# Patient Record
Sex: FEMALE | Race: WHITE | Employment: FULL TIME | URBAN - METROPOLITAN AREA
[De-identification: names, ages, dates, MRNs, and addresses within clinical notes are randomized per-mention and may not be internally consistent; named-entity substitution may affect disease eponyms.]

---

## 2017-06-15 ENCOUNTER — HOSPITAL ENCOUNTER (OUTPATIENT)
Dept: LAB | Age: 52
Discharge: HOME | End: 2017-06-15
Payer: COMMERCIAL

## 2017-06-15 DIAGNOSIS — E55.9 VITAMIN D INSUFFICIENCY: ICD-10-CM

## 2017-06-15 DIAGNOSIS — I10 ESSENTIAL HYPERTENSION: ICD-10-CM

## 2017-06-15 DIAGNOSIS — R73.03 PREDIABETES: ICD-10-CM

## 2017-06-15 LAB
25(OH)D3 SERPL-MCNC: 31.3 NG/ML (ref 30–100)
ANION GAP BLD CALC-SCNC: 6 MMOL/L (ref 3–18)
BUN SERPL-MCNC: 15 MG/DL (ref 7–18)
BUN/CREAT SERPL: 22 (ref 12–20)
CALCIUM SERPL-MCNC: 9.8 MG/DL (ref 8.5–10.1)
CHLORIDE SERPL-SCNC: 105 MMOL/L (ref 100–108)
CO2 SERPL-SCNC: 30 MMOL/L (ref 21–32)
CREAT SERPL-MCNC: 0.67 MG/DL (ref 0.6–1.3)
GLUCOSE SERPL-MCNC: 95 MG/DL (ref 74–99)
HBA1C MFR BLD: 5.6 % (ref 4.2–5.6)
POTASSIUM SERPL-SCNC: 4.5 MMOL/L (ref 3.5–5.5)
SODIUM SERPL-SCNC: 141 MMOL/L (ref 136–145)

## 2017-06-15 PROCEDURE — 82306 VITAMIN D 25 HYDROXY: CPT | Performed by: FAMILY MEDICINE

## 2017-06-15 PROCEDURE — 80048 BASIC METABOLIC PNL TOTAL CA: CPT | Performed by: FAMILY MEDICINE

## 2017-06-15 PROCEDURE — 36415 COLL VENOUS BLD VENIPUNCTURE: CPT | Performed by: FAMILY MEDICINE

## 2017-06-15 PROCEDURE — 83036 HEMOGLOBIN GLYCOSYLATED A1C: CPT | Performed by: FAMILY MEDICINE

## 2017-06-20 ENCOUNTER — OFFICE VISIT (OUTPATIENT)
Dept: FAMILY MEDICINE CLINIC | Age: 52
End: 2017-06-20

## 2017-06-20 VITALS
BODY MASS INDEX: 31.92 KG/M2 | DIASTOLIC BLOOD PRESSURE: 84 MMHG | TEMPERATURE: 98.8 F | WEIGHT: 187 LBS | SYSTOLIC BLOOD PRESSURE: 130 MMHG | HEIGHT: 64 IN | OXYGEN SATURATION: 98 % | HEART RATE: 64 BPM | RESPIRATION RATE: 16 BRPM

## 2017-06-20 DIAGNOSIS — G43.009 MIGRAINE WITHOUT AURA AND WITHOUT STATUS MIGRAINOSUS, NOT INTRACTABLE: ICD-10-CM

## 2017-06-20 DIAGNOSIS — I10 ESSENTIAL HYPERTENSION: Primary | ICD-10-CM

## 2017-06-20 DIAGNOSIS — E55.9 VITAMIN D DEFICIENCY: ICD-10-CM

## 2017-06-20 DIAGNOSIS — Z12.11 COLON CANCER SCREENING: ICD-10-CM

## 2017-06-20 DIAGNOSIS — R73.03 PREDIABETES: ICD-10-CM

## 2017-06-20 NOTE — PROGRESS NOTES
Chief Complaint   Patient presents with    Hypertension    Vitamin D Deficiency    Other     Pre-DM    Results     1. Have you been to the ER, urgent care clinic since your last visit? Hospitalized since your last visit? No    2. Have you seen or consulted any other health care providers outside of the [address] [place] [address] since your last visit? Include any pap smears or colon screening.  No

## 2017-06-20 NOTE — PROGRESS NOTES
Jeremías Hoang, 45 y.o.,  female    SUBJECTIVE  Routine ff-up    HTN- diet controlled for some time now.  says BP wnl. Continues to avoid sodas. Walking 4.5 miles/day. Reviewed labs    Prediabetes-reviewed labs, mildly deficient vit d, has not been taking supplements. Migraine headaches-says doing well, seldom episodes and imitrex effective. None past year. Says submit fit test for colon ca screening last week    ROS:  See HPI, all others negative        Patient Active Problem List   Diagnosis Code    Hypertension I10    Migraine G43.909    Vitamin D deficiency E55.9    Prediabetes R73.03    Colon cancer screening Z12.11       Current Outpatient Prescriptions   Medication Sig Dispense Refill    SUMAtriptan (IMITREX) 100 mg tablet Take 1 Tab by mouth once as needed for Migraine for up to 1 dose. Can repeat in 2 hours if persists. No more than 2 tabs in 24 hours. 9 Tab 0       No Known Allergies    Past Medical History:   Diagnosis Date    Anxiety     Depression     medication briefly a few years ago    Hypertension     previously on medication, stopped it on own about a year ago    Migraine     prn excedrine migraine    Pap smear about 10 years ago    no abnormals    Prediabetes 5/2015    Screening mammogram last about 2007    Vitamin D deficiency        Social History     Social History    Marital status:      Spouse name: N/A    Number of children: N/A    Years of education: N/A     Occupational History    Not on file.      Social History Main Topics    Smoking status: Never Smoker    Smokeless tobacco: Never Used    Alcohol use No    Drug use: No    Sexual activity: Not Currently     Partners: Male     Birth control/ protection: None      Comment: not for past 2 years     Other Topics Concern    Not on file     Social History Narrative       Family History   Problem Relation Age of Onset    Cancer Mother      non-hodgkins     Cancer Sister 28     breast    Breast Cancer Sister 39    Breast Cancer Maternal Grandmother          OBJECTIVE    Physical Exam:     Visit Vitals    /84 (BP 1 Location: Left arm, BP Patient Position: Sitting)    Pulse 64    Temp 98.8 °F (37.1 °C) (Oral)    Resp 16    Ht 5' 4\" (1.626 m)    Wt 187 lb (84.8 kg)    SpO2 98%    BMI 32.1 kg/m2       General: alert, well-appearing, in no apparent distress or pain  CVS: normal rate, regular rhythm, distinct S1 and S2  Lungs:clear to ausculation bilaterally, no crackles, wheezing or rhonchi noted  Extremities: no edema, no cyanosis,  Skin: warm, no lesions, rashes noted  Psych:  mood and affect normal    Results for orders placed or performed during the hospital encounter of 06/15/17   HEMOGLOBIN A1C W/O EAG   Result Value Ref Range    Hemoglobin A1c 5.6 4.2 - 5.6 %   METABOLIC PANEL, BASIC   Result Value Ref Range    Sodium 141 136 - 145 mmol/L    Potassium 4.5 3.5 - 5.5 mmol/L    Chloride 105 100 - 108 mmol/L    CO2 30 21 - 32 mmol/L    Anion gap 6 3.0 - 18 mmol/L    Glucose 95 74 - 99 mg/dL    BUN 15 7.0 - 18 MG/DL    Creatinine 0.67 0.6 - 1.3 MG/DL    BUN/Creatinine ratio 22 (H) 12 - 20      GFR est AA >60 >60 ml/min/1.73m2    GFR est non-AA >60 >60 ml/min/1.73m2    Calcium 9.8 8.5 - 10.1 MG/DL   VITAMIN D, 25 HYDROXY   Result Value Ref Range    Vitamin D 25-Hydroxy 31.3 30 - 100 ng/mL           ASSESSMENT/PLAN  Lakesha Petit was seen today for hypertension, migraine, vitamin d deficiency and leg pain. Diagnoses and all orders for this visit:    Migraine without aura and without status migrainosus, not intractable-  Improved, cont prn imitrex  Avoid ha triggers    Essential hypertension-  H/o, now Diet controlled, monitoring    Colon cancer screening-  Pt has submitted FIT test, await results    Prediabetes  Improved, commended on lifestyle changes  Monitoring      Follow-up Disposition:  Return in about 5 months (around 11/20/2017), or plan for CPE then. .     Patient understands plan of care.  Patient has provided input and agrees with goals.

## 2017-06-20 NOTE — MR AVS SNAPSHOT
Visit Information Date & Time Provider Department Dept. Phone Encounter #  
 6/20/2017  9:15 AM Rohith Grimes, [address] [id number] Follow-up Instructions Return in about 5 months (around 11/20/2017), or plan for CPE then. James Lindsey Upcoming Health Maintenance Date Due FOBT Q 1 YEAR AGE 50-75 11/14/2014 INFLUENZA AGE 9 TO ADULT 8/1/2017 BREAST CANCER SCRN MAMMOGRAM 9/13/2018 PAP AKA CERVICAL CYTOLOGY 9/7/2019 DTaP/Tdap/Td series (2 - Td) 2/1/2023 Allergies as of 6/20/2017  Review Complete On: 6/20/2017 By: Yamilet Leslie LPN No Known Allergies Current Immunizations  Reviewed on 2/1/2013 Name Date Tdap 2/1/2013 Not reviewed this visit You Were Diagnosed With   
  
 Codes Comments Essential hypertension    -  Primary ICD-10-CM: I10 
ICD-9-CM: 401.9 Prediabetes     ICD-10-CM: R73.03 
ICD-9-CM: 790.29 Vitamin D deficiency     ICD-10-CM: E55.9 ICD-9-CM: 268.9 Migraine without aura and without status migrainosus, not intractable     ICD-10-CM: M87.833 ICD-9-CM: 346.10 Colon cancer screening     ICD-10-CM: Z12.11 ICD-9-CM: V76.51 Vitals BP Pulse Temp Resp Height(growth percentile) Weight(growth percentile) 130/84 (BP 1 Location: Left arm, BP Patient Position: Sitting) 64 98.8 °F (37.1 °C) (Oral) 16 5' 4\" (1.626 m) 187 lb (84.8 kg) SpO2 BMI OB Status Smoking Status 98% 32.1 kg/m2 Perimenopausal Never Smoker Vitals History BMI and BSA Data Body Mass Index Body Surface Area  
 32.1 kg/m 2 1.96 m 2 Preferred Pharmacy Pharmacy Name Phone [id number]  [facility], [address] [place] [address] Ave [phone] Your Updated Medication List  
  
   
This list is accurate as of: 6/20/17  9:42 AM.  Always use your most recent med list.  
  
  
  
  
 SUMAtriptan 100 mg tablet Commonly known as:  IMITREX Take 1 Tab by mouth once as needed for Migraine for up to 1 dose. Can repeat in 2 hours if persists. No more than 2 tabs in 24 hours. Follow-up Instructions Return in about 5 months (around 11/20/2017), or plan for CPE then. Glenis Gee Patient Instructions Caltrate 1200 mg of calcium and 800 international units of Vitamin D. Introducing hospitals & HEALTH SERVICES! Shikhalogan Bradley introduces [email or web address] patient portal. Now you can access parts of your medical record, email your doctor's office, and request medication refills online. 1. In your internet browser, go to [email or web address] 2. Click on the First Time User? Click Here link in the Sign In box. You will see the New Member Sign Up page. 3. Enter your [email or web address] Access Code exactly as it appears below. You will not need to use this code after youve completed the sign-up process. If you do not sign up before the expiration date, you must request a new code. · [email or web address] Access Code: [email or web address] Expires: 9/18/2017  9:42 AM 
 
4. Enter the last four digits of your Social Security Number (xxxx) and Date of Birth (mm/dd/yyyy) as indicated and click Submit. You will be taken to the next sign-up page. 5. Create a [email or web address] ID. This will be your [email or web address] login ID and cannot be changed, so think of one that is secure and easy to remember. 6. Create a [email or web address] password. You can change your password at any time. 7. Enter your Password Reset Question and Answer. This can be used at a later time if you forget your password. 8. Enter your e-mail address. You will receive e-mail notification when new information is available in [address] 9. Click Sign Up. You can now view and download portions of your medical record. 10. Click the Download Summary menu link to download a portable copy of your medical information.  
 
If you have questions, please visit the Frequently Asked Questions section of the "[employer] Remember, [employer] is NOT to be used for urgent needs. For medical emergencies, dial 911. Now available from your iPhone and Android! Please provide this summary of care documentation to your next provider. Your primary care clinician is listed as Sri Palma. If you have any questions after today's visit, please call [phone].

## 2018-01-16 ENCOUNTER — OFFICE VISIT (OUTPATIENT)
Dept: FAMILY MEDICINE CLINIC | Age: 53
End: 2018-01-16

## 2018-01-16 ENCOUNTER — HOSPITAL ENCOUNTER (OUTPATIENT)
Dept: LAB | Age: 53
Discharge: HOME | End: 2018-01-16
Payer: COMMERCIAL

## 2018-01-16 VITALS
DIASTOLIC BLOOD PRESSURE: 82 MMHG | HEART RATE: 67 BPM | BODY MASS INDEX: 32.44 KG/M2 | OXYGEN SATURATION: 98 % | WEIGHT: 190 LBS | SYSTOLIC BLOOD PRESSURE: 126 MMHG | TEMPERATURE: 98.6 F | RESPIRATION RATE: 16 BRPM | HEIGHT: 64 IN

## 2018-01-16 DIAGNOSIS — E55.9 VITAMIN D DEFICIENCY: ICD-10-CM

## 2018-01-16 DIAGNOSIS — I10 ESSENTIAL HYPERTENSION: ICD-10-CM

## 2018-01-16 DIAGNOSIS — Z01.419 WELL WOMAN EXAM WITH ROUTINE GYNECOLOGICAL EXAM: Primary | ICD-10-CM

## 2018-01-16 DIAGNOSIS — M25.561 ACUTE PAIN OF RIGHT KNEE: ICD-10-CM

## 2018-01-16 DIAGNOSIS — G43.009 MIGRAINE WITHOUT AURA AND WITHOUT STATUS MIGRAINOSUS, NOT INTRACTABLE: ICD-10-CM

## 2018-01-16 DIAGNOSIS — R73.03 PREDIABETES: ICD-10-CM

## 2018-01-16 LAB
ALBUMIN SERPL-MCNC: 4.3 G/DL (ref 3.4–5)
ALBUMIN/GLOB SERPL: 1.2 {RATIO} (ref 0.8–1.7)
ALP SERPL-CCNC: 80 U/L (ref 45–117)
ALT SERPL-CCNC: 28 U/L (ref 13–56)
ANION GAP SERPL CALC-SCNC: 5 MMOL/L (ref 3–18)
AST SERPL-CCNC: 17 U/L (ref 15–37)
BILIRUB SERPL-MCNC: 1.1 MG/DL (ref 0.2–1)
BUN SERPL-MCNC: 18 MG/DL (ref 7–18)
BUN/CREAT SERPL: 28 (ref 12–20)
CALCIUM SERPL-MCNC: 9.7 MG/DL (ref 8.5–10.1)
CHLORIDE SERPL-SCNC: 104 MMOL/L (ref 100–108)
CHOLEST SERPL-MCNC: 211 MG/DL
CO2 SERPL-SCNC: 32 MMOL/L (ref 21–32)
CREAT SERPL-MCNC: 0.65 MG/DL (ref 0.6–1.3)
GLOBULIN SER CALC-MCNC: 3.5 G/DL (ref 2–4)
GLUCOSE SERPL-MCNC: 83 MG/DL (ref 74–99)
HDLC SERPL-MCNC: 59 MG/DL (ref 40–60)
HDLC SERPL: 3.6 {RATIO} (ref 0–5)
LDLC SERPL CALC-MCNC: 132.2 MG/DL (ref 0–100)
LIPID PROFILE,FLP: ABNORMAL
POTASSIUM SERPL-SCNC: 4.6 MMOL/L (ref 3.5–5.5)
PROT SERPL-MCNC: 7.8 G/DL (ref 6.4–8.2)
SODIUM SERPL-SCNC: 141 MMOL/L (ref 136–145)
TRIGL SERPL-MCNC: 99 MG/DL (ref ?–150)
VLDLC SERPL CALC-MCNC: 19.8 MG/DL

## 2018-01-16 PROCEDURE — 80053 COMPREHEN METABOLIC PANEL: CPT | Performed by: FAMILY MEDICINE

## 2018-01-16 PROCEDURE — 80061 LIPID PANEL: CPT | Performed by: FAMILY MEDICINE

## 2018-01-16 RX ORDER — SUMATRIPTAN 100 MG/1
100 TABLET, FILM COATED ORAL
Qty: 9 TAB | Refills: 0 | Status: SHIPPED | OUTPATIENT
Start: 2018-01-16 | End: 2020-03-19

## 2018-01-16 RX ORDER — METHYLPREDNISOLONE 4 MG/1
TABLET ORAL
Qty: 1 DOSE PACK | Refills: 0 | Status: SHIPPED | OUTPATIENT
Start: 2018-01-16 | End: 2020-03-19

## 2018-01-16 NOTE — PROGRESS NOTES
Chief Complaint   Patient presents with    Well Woman     last pap 9/7/16-WNL    Hypertension    Other     Pre-DM    Vitamin D Deficiency    Knee Pain     Since early Dec- seen at [address] [place] [address] and was told arthritis- no improvement and feels worse soomedays     Patient presents for annual pap smear. Last pap 9/7/16. Abnormal Pap smears -No.  Procedures if indicated No .Form of contraception -No. Mammogram (if indicated) 9/13/16. Family history of breast CA -MGM 60's-D, Sister 34-D, colon CA -No, cervical CA -No.Tetanus 2/1/2013.  States mom passed from Non Hodgkin's Lymphoma

## 2018-01-16 NOTE — PATIENT INSTRUCTIONS
Knee (Pes Anserine) Bursitis: Exercises  Your Care Instructions  Here are some examples of typical rehabilitation exercises for your condition. Start each exercise slowly. Ease off the exercise if you start to have pain. Your doctor or physical therapist will tell you when you can start these exercises and which ones will work best for you. How to do the exercises  Heel slide    1. Lie on your back with your affected knee straight. Your good knee should be bent. 2. Bend your affected knee by sliding your heel across the floor and toward your buttock until you feel a gentle stretch in your knee. 3. Hold for about 6 seconds, and then slowly straighten your knee. 4. Repeat 8 to 12 times. Quad sets    1. Sit with your affected leg straight and supported on the floor or a firm bed. Place a small, rolled-up towel under your affected knee. Your other leg should be bent, with that foot flat on the floor. 2. Tighten the thigh muscles of your affected leg by pressing the back of your knee down into the towel. 3. Hold for about 6 seconds, then rest for up to 10 seconds. 4. Repeat 8 to 12 times. Straight-leg raises to the front    1. Lie on your back with your good knee bent so that your foot rests flat on the floor. Your affected leg should be straight. Make sure that your low back has a normal curve. You should be able to slip your hand in between the floor and the small of your back, with your palm touching the floor and your back touching the back of your hand. 2. Tighten the thigh muscles in your affected leg by pressing the back of your knee flat down to the floor. Hold your knee straight. 3. Keeping the thigh muscles tight and your leg straight, lift your affected leg up so that your heel is about 12 inches off the floor. 4. Hold for about 6 seconds, then lower slowly. Rest for up to 10 seconds between repetitions. 5. Repeat 8 to 12 times. Follow-up care is a key part of your treatment and safety.  Be sure to make and go to all appointments, and call your doctor if you are having problems. It's also a good idea to know your test results and keep a list of the medicines you take. Where can you learn more? Go to http://beata-ryan.info/. Enter K371 in the search box to learn more about \"Knee (Pes Anserine) Bursitis: Exercises. \"  Current as of: March 21, 2017  Content Version: 11.4  © 0952-3628 Healthwise, Incorporated. Care instructions adapted under license by [employer] (which disclaims liability or warranty for this information). If you have questions about a medical condition or this instruction, always ask your healthcare professional. [address] any warranty or liability for your use of this information.

## 2018-01-16 NOTE — MR AVS SNAPSHOT
[address] 
[phone] Patient: Joana Miranda MRN: [id number] :1965 Visit Information Date & Time Provider Department Dept. Phone Encounter #  
 2018  638 [facility], [address] [place] [address] [id number] Follow-up Instructions Return in about 3 weeks (around 2018), or if symptoms worsen or fail to improve. Upcoming Health Maintenance Date Due FOBT Q 1 YEAR AGE 50-75 6/15/2018 BREAST CANCER SCRN MAMMOGRAM 2018 PAP AKA CERVICAL CYTOLOGY 2019 DTaP/Tdap/Td series (2 - Td) 2023 Allergies as of 2018  Review Complete On: 2018 By: Antoni Andrade MD  
 No Known Allergies Current Immunizations  Reviewed on 2013 Name Date Tdap 2013 Not reviewed this visit You Were Diagnosed With   
  
 Codes Comments Well woman exam with routine gynecological exam    -  Primary ICD-10-CM: G26.614 ICD-9-CM: V72.31 Prediabetes     ICD-10-CM: R73.03 
ICD-9-CM: 790.29 Vitamin D deficiency     ICD-10-CM: E55.9 ICD-9-CM: 268.9 Migraine without aura and without status migrainosus, not intractable     ICD-10-CM: X29.740 ICD-9-CM: 346.10 Essential hypertension     ICD-10-CM: I10 
ICD-9-CM: 401.9 Acute pain of right knee     ICD-10-CM: M25.561 ICD-9-CM: 719.46 Vitals BP Pulse Temp Resp Height(growth percentile) Weight(growth percentile) 126/82 (BP 1 Location: Left arm, BP Patient Position: Sitting) 67 98.6 °F (37 °C) (Oral) 16 5' 4\" (1.626 m) 190 lb (86.2 kg) SpO2 BMI OB Status Smoking Status 98% 32.61 kg/m2 Perimenopausal Never Smoker BMI and BSA Data Body Mass Index Body Surface Area  
 32.61 kg/m 2 1.97 m 2 Preferred Pharmacy Pharmacy Name Phone [id number]  [facility], [address] [facility] [phone] Your Updated Medication List  
  
   
This list is accurate as of: 1/16/18  9:38 AM.  Always use your most recent med list.  
  
  
  
  
 SUMAtriptan 100 mg tablet Commonly known as:  IMITREX Take 1 Tab by mouth once as needed for Migraine for up to 1 dose. Can repeat in 2 hours if persists. No more than 2 tabs in 24 hours. Prescriptions Sent to Pharmacy Refills SUMAtriptan (IMITREX) 100 mg tablet 0 Sig: Take 1 Tab by mouth once as needed for Migraine for up to 1 dose. Can repeat in 2 hours if persists. No more than 2 tabs in 24 hours. Class: Normal  
 [facility]: [facility] #: [phone] Route: Oral  
  
Follow-up Instructions Return in about 3 weeks (around 2/6/2018), or if symptoms worsen or fail to improve. To-Do List   
 01/16/2018 Lab:  LIPID PANEL   
  
 01/16/2018 Imaging:  PELON MAMMO BI SCREENING INCL CAD   
  
 01/16/2018 Lab:  METABOLIC PANEL, COMPREHENSIVE   
  
 01/16/2018 Imaging:  XR KNEE RT MIN 4 V Patient Instructions Knee (Pes Anserine) Bursitis: Exercises Your Care Instructions Here are some examples of typical rehabilitation exercises for your condition. Start each exercise slowly. Ease off the exercise if you start to have pain. Your doctor or physical therapist will tell you when you can start these exercises and which ones will work best for you. How to do the exercises Heel slide 1. Lie on your back with your affected knee straight. Your good knee should be bent. 2. Bend your affected knee by sliding your heel across the floor and toward your buttock until you feel a gentle stretch in your knee. 3. Hold for about 6 seconds, and then slowly straighten your knee. 4. Repeat 8 to 12 times. [place] 1. Sit with your affected leg straight and supported on the floor or a firm bed. Place a small, rolled-up towel under your affected knee.  Your other leg should be bent, with that foot flat on the floor. 2. Tighten the thigh muscles of your affected leg by pressing the back of your knee down into the towel. 3. Hold for about 6 seconds, then rest for up to 10 seconds. 4. Repeat 8 to 12 times. Straight-leg raises to the front 1. Lie on your back with your good knee bent so that your foot rests flat on the floor. Your affected leg should be straight. Make sure that your low back has a normal curve. You should be able to slip your hand in between the floor and the small of your back, with your palm touching the floor and your back touching the back of your hand. 2. Tighten the thigh muscles in your affected leg by pressing the back of your knee flat down to the floor. Hold your knee straight. 3. Keeping the thigh muscles tight and your leg straight, lift your affected leg up so that your heel is about 12 inches off the floor. 4. Hold for about 6 seconds, then lower slowly. Rest for up to 10 seconds between repetitions. 5. Repeat 8 to 12 times. Follow-up care is a key part of your treatment and safety. Be sure to make and go to all appointments, and call your doctor if you are having problems. It's also a good idea to know your test results and keep a list of the medicines you take. Where can you learn more? Go to http://beata-ryan.info/. Enter W917 in the search box to learn more about \"Knee (Pes Anserine) Bursitis: Exercises. \" Current as of: March 21, 2017 Content Version: 11.4 © 8261-8223 Healthwise, Incorporated. Care instructions adapted under license by [employer] (which disclaims liability or warranty for this information). If you have questions about a medical condition or this instruction, always ask your healthcare professional. [address] any warranty or liability for your use of this information. Introducing \[facility]\"" & HEALTH SERVICES! Michele David introduces [employer] patient portal. Now you can access parts of your medical record, email your doctor's office, and request medication refills online. 1. In your internet browser, go to [email or web address] 2. Click on the First Time User? Click Here link in the Sign In box. You will see the New Member Sign Up page. 3. Enter your [employer] Access Code exactly as it appears below. You will not need to use this code after youve completed the sign-up process. If you do not sign up before the expiration date, you must request a new code. · [employer] Access Code: [email or web address] Expires: 4/16/2018  9:38 AM 
 
4. Enter the last four digits of your Social Security Number (xxxx) and Date of Birth (mm/dd/yyyy) as indicated and click Submit. You will be taken to the next sign-up page. 5. Create a [employer] ID. This will be your [employer] login ID and cannot be changed, so think of one that is secure and easy to remember. 6. Create a [employer] password. You can change your password at any time. 7. Enter your Password Reset Question and Answer. This can be used at a later time if you forget your password. 8. Enter your e-mail address. You will receive e-mail notification when new information is available in [address] 9. Click Sign Up. You can now view and download portions of your medical record. 10. Click the Download Summary menu link to download a portable copy of your medical information. If you have questions, please visit the Frequently Asked Questions section of the [employer] website. Remember, [employer] is NOT to be used for urgent needs. For medical emergencies, dial 911. Now available from your iPhone and Android! Please provide this summary of care documentation to your next provider. Your primary care clinician is listed as Joselin Jean-Baptiste. If you have any questions after today's visit, please call [phone].

## 2018-01-16 NOTE — PROGRESS NOTES
Subjective:   47 y.o. female for Well Woman Check. No LMP recorded. Patient is not currently having periods (Reason: Perimenopausal). H/o HTN- that is not diet controlled. Off medications for few years now  Prediabetes-reports some dietary inconsistencies  Migraine headaches- says rare episodes, requesting refill on imitrex  R knee pain for 1 month now, constant worse with plantar flexion when driving. No trauma, swelling. Has tried ibuprofen without much relief. Says seen in patient first xray showing mild arthritis. No significant improvement    Past Medical History:   Diagnosis Date    Anxiety     Depression     medication briefly a few years ago    Hypertension     previously on medication, stopped it on own about a year ago    Migraine     prn excedrine migraine    Pap smear about 10 years ago    no abnormals    Prediabetes 5/2015    Screening mammogram last about 2007    Vitamin D deficiency      History reviewed. No pertinent surgical history. Family History   Problem Relation Age of Onset    Cancer Mother      non-hodgkins     Cancer Sister 28     breast    Breast Cancer Sister 39    Breast Cancer Maternal Grandmother      Social History   Substance Use Topics    Smoking status: Never Smoker    Smokeless tobacco: Never Used    Alcohol use No        ROS:  Feeling well. No dyspnea or chest pain on exertion. No abdominal pain, change in bowel habits, black or bloody stools. No urinary tract symptoms. GYN ROS: no breast pain or new or enlarging lumps on self exam. No neurological complaints. Objective:     Visit Vitals    /82 (BP 1 Location: Left arm, BP Patient Position: Sitting)    Pulse 67    Temp 98.6 °F (37 °C) (Oral)    Resp 16    Ht 5' 4\" (1.626 m)    Wt 190 lb (86.2 kg)    SpO2 98%    BMI 32.61 kg/m2     The patient appears well, alert, oriented x 3, in no distress. ENT normal.  Neck supple. No adenopathy or thyromegaly. JIAN.  Lungs are clear, good air entry, no wheezes, rhonchi or rales. S1 and S2 normal, no murmurs, regular rate and rhythm. Abdomen soft without tenderness, guarding, mass or organomegaly. Extremities show no edema, normal peripheral pulses. R knee +MTTP on R anteromedial area. Neurological is normal, no focal findings. BREAST EXAM: breasts appear normal, no suspicious masses, no skin or nipple changes or axillary nodes    PELVIC EXAM: examination not indicated    Assessment/Plan:   Diagnoses and all orders for this visit:    1. Well woman exam with routine gynecological exam  -     [facility] MAMMO BI SCREENING INCL CAD; Future  well woman  Mammogram-due soon, order placed  pap smear- update 2019  return annually or prn  reviewed diet, exercise and weight control. FIT test update 6/18  tdap UTD  Declines flu vaccine    2. Prediabetes  Tlcs, monitoring  -     METABOLIC PANEL, COMPREHENSIVE; Future    3. Vitamin D deficiency  Cont daily supplement  Recheck 6.18    4. Migraine without aura and without status migrainosus, not intractable  Stable, cont prn imitrex    5. Essential hypertension  Diet controlled, off medication for few years now  monitoring  -     METABOLIC PANEL, COMPREHENSIVE; Future  -     LIPID PANEL; Future    6. Acute pain of right knee  Pes anserine bursitis HEP provided  Medrol dose pack  Close ff-up  Per pt xray from patient first shows mild OA, request records    Other orders  -     SUMAtriptan (IMITREX) 100 mg tablet; Take 1 Tab by mouth once as needed for Migraine for up to 1 dose. Can repeat in 2 hours if persists. No more than 2 tabs in 24 hours. -     methylPREDNISolone (MEDROL, LAMAR,) 4 mg tablet; Per dose pack instructions    Ff-up in 3 weeks or sooner prn    Patient/guardian understands plan of care. Patient has provided input and agrees with goals. Future labs to be discussed on next visit.

## 2018-01-22 ENCOUNTER — OFFICE VISIT (OUTPATIENT)
Dept: FAMILY MEDICINE CLINIC | Age: 53
End: 2018-01-22

## 2018-01-22 VITALS
TEMPERATURE: 97.9 F | DIASTOLIC BLOOD PRESSURE: 84 MMHG | RESPIRATION RATE: 16 BRPM | HEART RATE: 73 BPM | WEIGHT: 190 LBS | OXYGEN SATURATION: 98 % | HEIGHT: 64 IN | BODY MASS INDEX: 32.44 KG/M2 | SYSTOLIC BLOOD PRESSURE: 128 MMHG

## 2018-01-22 DIAGNOSIS — Z86.79 HISTORY OF HYPERTENSION: ICD-10-CM

## 2018-01-22 DIAGNOSIS — E55.9 VITAMIN D DEFICIENCY: ICD-10-CM

## 2018-01-22 DIAGNOSIS — G43.009 MIGRAINE WITHOUT AURA AND WITHOUT STATUS MIGRAINOSUS, NOT INTRACTABLE: ICD-10-CM

## 2018-01-22 DIAGNOSIS — R73.03 PREDIABETES: ICD-10-CM

## 2018-01-22 DIAGNOSIS — M79.651 PAIN OF RIGHT THIGH: Primary | ICD-10-CM

## 2018-01-22 DIAGNOSIS — M25.561 ACUTE PAIN OF RIGHT KNEE: ICD-10-CM

## 2018-01-22 RX ORDER — NAPROXEN 500 MG/1
500 TABLET ORAL 2 TIMES DAILY WITH MEALS
Qty: 30 TAB | Refills: 0 | Status: SHIPPED | OUTPATIENT
Start: 2018-01-22 | End: 2020-03-19

## 2018-01-22 RX ORDER — CYCLOBENZAPRINE HCL 10 MG
10 TABLET ORAL
Qty: 30 TAB | Refills: 0 | Status: SHIPPED | OUTPATIENT
Start: 2018-01-22 | End: 2020-03-19

## 2018-01-22 RX ORDER — KETOROLAC TROMETHAMINE 30 MG/ML
60 INJECTION, SOLUTION INTRAMUSCULAR; INTRAVENOUS ONCE
Qty: 1 VIAL | Refills: 0
Start: 2018-01-22 | End: 2018-01-22

## 2018-01-22 NOTE — LETTER
NOTIFICATION RETURN TO WORK / SCHOOL 
 
1/22/2018 3:27 PM 
 
Ms. Iqra Lay [address] [place] [address] [phone] [id number] To Whom It May Concern: 
 
Iqra Lay is currently under the care of [facility]. Please excuse from work 1/22/2018-1/24/2018 If there are questions or concerns please have the patient contact our office.  
 
 
 
Sincerely, 
 
 
Ludy Chávez MD

## 2018-01-22 NOTE — PROGRESS NOTES
1. Have you been to the ER, urgent care clinic since your last visit? Hospitalized since your last visit? No    2. Have you seen or consulted any other health care providers outside of the [address] [place] [address] since your last visit? Include any pap smears or colon screening.  No

## 2018-01-22 NOTE — PROGRESS NOTES
Ff-up    H/o HTN- that is not diet controlled. Off medications for few years now    Prediabetes-reports some dietary inconsistencies, reviewed labs improved    Migraine headaches- says rare episodes, requesting refill on imitrex    R knee pain for 1 month now, constant worse with plantar flexion when driving. No trauma, swelling. Has tried ibuprofen without much relief. Says seen in patient first xray showing mild arthritis. No significant improvement- given po steroids and HEP for pes anserine bursitis with some improvement  This morning after few steps going downstairs she had intense pain on posterior leg and thigh, constant. She says can hardly stand up. No trauma or swelling. Past Medical History:   Diagnosis Date    Anxiety     Depression     medication briefly a few years ago    Hypertension     previously on medication, stopped it on own about a year ago    Migraine     prn excedrine migraine    Pap smear about 10 years ago    no abnormals    Prediabetes 5/2015    Screening mammogram last about 2007    Vitamin D deficiency      History reviewed. No pertinent surgical history. Family History   Problem Relation Age of Onset    Cancer Mother      non-hodgkins     Cancer Sister 28     breast    Breast Cancer Sister 39    Breast Cancer Maternal Grandmother      Social History   Substance Use Topics    Smoking status: Never Smoker    Smokeless tobacco: Never Used    Alcohol use No        ROS:  Feeling well. No dyspnea or chest pain on exertion. No abdominal pain, change in bowel habits, black or bloody stools. No urinary tract symptoms. GYN ROS: no breast pain or new or enlarging lumps on self exam. No neurological complaints.     Objective:     Visit Vitals    /84 (BP 1 Location: Left arm, BP Patient Position: Sitting)    Pulse 73    Temp 97.9 °F (36.6 °C) (Oral)    Resp 16    Ht 5' 4\" (1.626 m)    Wt 190 lb (86.2 kg)    SpO2 98%    BMI 32.61 kg/m2     The patient appears well, on wheelchair, appears to be in pain. ENT normal.  Neck supple. No adenopathy or thyromegaly. JIAN. Lungs are clear, good air entry, no wheezes, rhonchi or rales. S1 and S2 normal, no murmurs, regular rate and rhythm. Abdomen soft without tenderness, guarding, mass or organomegaly. Extremities show no edema, normal peripheral pulses. R knee  FROM +MTTP on R hamstring area. Neurological is normal, no focal findings. Results for orders placed or performed during the hospital encounter of 01/16/18   LIPID PANEL   Result Value Ref Range    LIPID PROFILE          Cholesterol, total 211 (H) <200 MG/DL    Triglyceride 99 <150 MG/DL    HDL Cholesterol 59 40 - 60 MG/DL    LDL, calculated 132.2 (H) 0 - 100 MG/DL    VLDL, calculated 19.8 MG/DL    CHOL/HDL Ratio 3.6 0 - 5.0     METABOLIC PANEL, COMPREHENSIVE   Result Value Ref Range    Sodium 141 136 - 145 mmol/L    Potassium 4.6 3.5 - 5.5 mmol/L    Chloride 104 100 - 108 mmol/L    CO2 32 21 - 32 mmol/L    Anion gap 5 3.0 - 18 mmol/L    Glucose 83 74 - 99 mg/dL    BUN 18 7.0 - 18 MG/DL    Creatinine 0.65 0.6 - 1.3 MG/DL    BUN/Creatinine ratio 28 (H) 12 - 20      GFR est AA >60 >60 ml/min/1.73m2    GFR est non-AA >60 >60 ml/min/1.73m2    Calcium 9.7 8.5 - 10.1 MG/DL    Bilirubin, total 1.1 (H) 0.2 - 1.0 MG/DL    ALT (SGPT) 28 13 - 56 U/L    AST (SGOT) 17 15 - 37 U/L    Alk.  phosphatase 80 45 - 117 U/L    Protein, total 7.8 6.4 - 8.2 g/dL    Albumin 4.3 3.4 - 5.0 g/dL    Globulin 3.5 2.0 - 4.0 g/dL    A-G Ratio 1.2 0.8 - 1.7         Assessment/Plan:   Diagnoses and all orders for this visit:     Acute pain of right knee  Pes anserine bursitis HEP provided  Some improvement after Medrol dose pack  Per pt xray from patient first shows mild OA   referral to ortho for persistent pain    Acute posterior thigh pain  HEP provided for hamstring strain  toradol IM given today  Flexeril/naprosyn  Close ff-up     Prediabetes  Tlcs, monitoring    Vitamin D deficiency  Cont daily supplement  Recheck 6.18     Migraine without aura and without status migrainosus, not intractable  Stable, cont prn imitrex    History of hypertension    Ff-up in 4  Days or sooner prn    Patient/guardian understands plan of care. Patient has provided input and agrees with goals. Future labs to be discussed on next visit.

## 2018-01-22 NOTE — MR AVS SNAPSHOT
[address] [facility] 250 200 [facility] 
[phone] Patient: David Carson MRN: [id number] :1965 Visit Information Date & Time Provider Department Dept. Phone Encounter #  
 2018  2:45 PM Maru Santillan, [address] [place] [address] [id number] Follow-up Instructions Return in about 4 days (around 2018). Your Appointments 2018  8:15 AM  
FOLLOW UP EXAM with Maru Santillan MD  
 [facility] ([facility]) Appt Note: routine f/u 3wks [address] [facility] [address] 200 [facility] Se  
[address] [facility] 200 [facility] [address] Upcoming Health Maintenance Date Due FOBT Q 1 YEAR AGE 50-75 6/15/2018 BREAST CANCER SCRN MAMMOGRAM 2018 PAP AKA CERVICAL CYTOLOGY 2019 DTaP/Tdap/Td series (2 - Td) 2023 Allergies as of 2018  Review Complete On: 2018 By: Princess Coleman LPN No Known Allergies Current Immunizations  Reviewed on 2013 Name Date Tdap 2013 Not reviewed this visit You Were Diagnosed With   
  
 Codes Comments Pain of right thigh    -  Primary ICD-10-CM: M86.029 ICD-9-CM: 729.5 Prediabetes     ICD-10-CM: R73.03 
ICD-9-CM: 790.29 Vitamin D deficiency     ICD-10-CM: E55.9 ICD-9-CM: 268.9 Migraine without aura and without status migrainosus, not intractable     ICD-10-CM: Y08.285 ICD-9-CM: 346.10 History of hypertension     ICD-10-CM: Z86.79 
ICD-9-CM: V12.59 Acute pain of right knee     ICD-10-CM: M25.561 ICD-9-CM: 719.46 Vitals BP Pulse Temp Resp Height(growth percentile) Weight(growth percentile) 128/84 (BP 1 Location: Left arm, BP Patient Position: Sitting) 73 97.9 °F (36.6 °C) (Oral) 16 5' 4\" (1.626 m) 190 lb (86.2 kg) SpO2 BMI OB Status Smoking Status 98% 32.61 kg/m2 Perimenopausal Never Smoker Vitals History BMI and BSA Data Body Mass Index Body Surface Area  
 32.61 kg/m 2 1.97 m 2 Preferred Pharmacy Pharmacy Name Phone [id number]  [address] [facility], [address] [place] [address] [phone] Your Updated Medication List  
  
   
This list is accurate as of: 1/22/18  3:26 PM.  Always use your most recent med list.  
  
  
  
  
 cyclobenzaprine 10 mg tablet Commonly known as:  FLEXERIL Take 1 Tab by mouth three (3) times daily as needed for Muscle Spasm(s). ketorolac tromethamine 60 mg/2 mL Soln Commonly known as:  TORADOL  
2 mL by IntraMUSCular route once for 1 dose. methylPREDNISolone 4 mg tablet Commonly known as:  MEDROL (LAMAR) Per dose pack instructions  
  
 naproxen 500 mg tablet Commonly known as:  NAPROSYN Take 1 Tab by mouth two (2) times daily (with meals). SUMAtriptan 100 mg tablet Commonly known as:  IMITREX Take 1 Tab by mouth once as needed for Migraine for up to 1 dose. Can repeat in 2 hours if persists. No more than 2 tabs in 24 hours. Prescriptions Sent to Pharmacy Refills  
 cyclobenzaprine (FLEXERIL) 10 mg tablet 0 Sig: Take 1 Tab by mouth three (3) times daily as needed for Muscle Spasm(s). Class: Normal  
 Pharmacy: [address] Ph #: [phone] Route: Oral  
 naproxen (NAPROSYN) 500 mg tablet 0 Sig: Take 1 Tab by mouth two (2) times daily (with meals). Class: Normal  
 Pharmacy: [facility] [address] Ph #: [phone] Route: Oral  
  
We Performed the Following KETOROLAC TROMETHAMINE INJ [ [facility]] [place] THER/PROPH/DIAG INJECTION, SUBCUT/IM [id number] CPT(R)] REFERRAL TO ORTHOPEDIC SURGERY [REF62 Custom] Follow-up Instructions Return in about 4 days (around 1/26/2018). Referral Information Referral ID Referred By Referred To  
  
 [id number] Jermaine Ching MD   
   [address] [facility] Leandro Reeves, [address] Phone: [phone] Fax: [phone] Visits Status Start Date End Date 1 New Request 1/22/18 1/22/19 If your referral has a status of pending review or denied, additional information will be sent to support the outcome of this decision. Patient Instructions Hamstring Strain: Rehab Exercises Your Care Instructions Here are some examples of typical rehabilitation exercises for your condition. Start each exercise slowly. Ease off the exercise if you start to have pain. Your doctor or physical therapist will tell you when you can start these exercises and which ones will work best for you. How to do the exercises Hamstring set (heel dig) 1. Sit with your affected leg bent. Your good leg should be straight and supported on the floor. 2. Tighten the muscles on the back of your bent leg (hamstring) by pressing your heel into the floor. 3. Hold for about 6 seconds, and then rest for up to 10 seconds. 4. Repeat 8 to 12 times. Hamstring curl 1. Lie on your stomach with your knees straight. Place a pillow under your stomach. If your kneecap is uncomfortable, roll up a washcloth and put it under your leg just above your kneecap. 2. Lift the foot of your affected leg by bending your knee so that you bring your foot up toward your buttock. If this motion hurts, try it without bending your knee quite as far. This may help you avoid any painful motion. 3. Slowly move your leg up and down. 4. Repeat 8 to 12 times. 5. When you can do this exercise with ease and no pain, add some resistance. To do this: 
6. Tie the ends of an exercise band together to form a loop.  Attach one end of the loop to a secure object or shut a door on it to hold it in place. (Or you can have someone hold one end of the loop to provide resistance.) 7. Loop the other end of the exercise band around the lower part of your affected leg. 8. Repeat steps 1 through 4, slowly pulling back on the exercise band with your leg. Hip extension 1. Stand facing a wall with your hands on the wall at about chest level. 2. Keeping the knee of your affected leg straight, kick that leg straight back behind you. 3. Relax, and lower your leg back to the starting position. 4. Repeat 8 to 12 times. 5. When you can do this exercise with ease and no pain, add some resistance. To do this: 
6. Tie the ends of an exercise band together to form a loop. Attach one end of the loop to a secure object or shut a door on it to hold it in place. (Or you can have someone hold one end of the loop to provide resistance.) 7. Loop the other end of the exercise band around the lower part of your affected leg. 8. Repeat steps 1 through 4, slowly pulling back on the exercise band with your leg. Hamstring wall stretch 1. Lie on your back in a doorway, with your good leg through the open door. 2. Slide your affected leg up the wall to straighten your knee. You should feel a gentle stretch down the back of your leg. 1. Do not arch your back. 2. Do not bend either knee. 3. Keep one heel touching the floor and the other heel touching the wall. Do not point your toes. 3. Hold the stretch for at least 1 minute to begin. Then try to lengthen the time you hold the stretch to as long as 6 minutes. 4. Repeat 2 to 4 times. 5. If you do not have a place to do this exercise in a doorway, there is another way to do it: 6. Lie on your back, and bend the knee of your affected leg. 7. Loop a towel under the ball and toes of that foot, and hold the ends of the towel in your hands. 8. Straighten your knee, and slowly pull back on the towel. You should feel a gentle stretch down the back of your leg. 9. Hold the stretch for 15 to 30 seconds. Or even better, hold the stretch for 1 minute if you can. 10. Repeat 2 to 4 times. Calf stretch 1. Stand facing a wall with your hands on the wall at about eye level. Put your affected leg about a step behind your other leg. 2. Keeping your back leg straight and your back heel on the floor, bend your front knee and gently bring your hip and chest toward the wall until you feel a stretch in the calf of your back leg. 3. Hold the stretch for 15 to 30 seconds. 4. Repeat 2 to 4 times. 5. Repeat steps 1 through 4, but this time keep your back knee bent. Single-leg balance 1. Stand on a flat surface with your arms stretched out to your sides like you are making the letter \"T. \" Then lift your good leg off the floor, bending it at the knee. If you are not steady on your feet, use one hand to hold on to a chair, counter, or wall. 2. Standing on your affected leg, keep that knee straight. Try to balance on that leg for up to 30 seconds. Then rest for up to 10 seconds. 3. Repeat 6 to 8 times. 4. When you can balance on your affected leg for 30 seconds with your eyes open, try to balance on it with your eyes closed. 5. When you can do this exercise with your eyes closed for 30 seconds and with ease and no pain, try standing on a pillow or piece of foam, and repeat steps 1 through 4. Follow-up care is a key part of your treatment and safety. Be sure to make and go to all appointments, and call your doctor if you are having problems. It's also a good idea to know your test results and keep a list of the medicines you take. Where can you learn more? Go to http://beata-ryan.info/. Enter [id number] in the search box to learn more about \"Hamstring Strain: Rehab Exercises. \" Current as of: March 21, 2017 Content Version: 11.4 © 9160-1426 Healthwise, Incorporated.  Care instructions adapted under license by [address] (which disclaims liability or warranty for this information). If you have questions about a medical condition or this instruction, always ask your healthcare professional. [address] any warranty or liability for your use of this information. Introducing [facility]! Remedios Coffman introduces [employer] patient portal. Now you can access parts of your medical record, email your doctor's office, and request medication refills online. 1. In your internet browser, go to [email or web address] 2. Click on the First Time User? Click Here link in the Sign In box. You will see the New Member Sign Up page. 3. Enter your [employer] Access Code exactly as it appears below. You will not need to use this code after youve completed the sign-up process. If you do not sign up before the expiration date, you must request a new code. · [employer] Access Code: [email or web address] Expires: 4/16/2018  9:38 AM 
 
4. Enter the last four digits of your Social Security Number (xxxx) and Date of Birth (mm/dd/yyyy) as indicated and click Submit. You will be taken to the next sign-up page. 5. Create a [employer] ID. This will be your [employer] login ID and cannot be changed, so think of one that is secure and easy to remember. 6. Create a [employer] password. You can change your password at any time. 7. Enter your Password Reset Question and Answer. This can be used at a later time if you forget your password. 8. Enter your e-mail address. You will receive e-mail notification when new information is available in [address] 9. Click Sign Up. You can now view and download portions of your medical record. 10. Click the Download Summary menu link to download a portable copy of your medical information. If you have questions, please visit the Frequently Asked Questions section of the [employer] website.  Remember, [employer] is NOT to be used for urgent needs. For medical emergencies, dial 911. Now available from your iPhone and Android! Please provide this summary of care documentation to your next provider. Your primary care clinician is listed as Guicho Romero. If you have any questions after today's visit, please call [phone].

## 2018-01-25 ENCOUNTER — OFFICE VISIT (OUTPATIENT)
Dept: ORTHOPEDIC SURGERY | Age: 53
End: 2018-01-25

## 2018-01-25 VITALS
SYSTOLIC BLOOD PRESSURE: 141 MMHG | HEART RATE: 72 BPM | OXYGEN SATURATION: 100 % | HEIGHT: 64 IN | DIASTOLIC BLOOD PRESSURE: 90 MMHG | BODY MASS INDEX: 32.81 KG/M2 | WEIGHT: 192.2 LBS

## 2018-01-25 DIAGNOSIS — M51.9 LUMBAR DISC DISEASE: ICD-10-CM

## 2018-01-25 DIAGNOSIS — S76.319A HAMSTRING STRAIN, INITIAL ENCOUNTER: ICD-10-CM

## 2018-01-25 DIAGNOSIS — M25.561 RIGHT KNEE PAIN, UNSPECIFIED CHRONICITY: Primary | ICD-10-CM

## 2018-01-25 DIAGNOSIS — M54.5 LOW BACK PAIN, UNSPECIFIED BACK PAIN LATERALITY, UNSPECIFIED CHRONICITY, WITH SCIATICA PRESENCE UNSPECIFIED: ICD-10-CM

## 2018-01-25 NOTE — MR AVS SNAPSHOT
[address], [address] 200 [facility] [address] 
[phone] Patient: Laure Newby MRN: [id number] :1965 Visit Information Date & Time Provider Department Dept. Phone Encounter #  
 2018  2:30 PM Aly Acevedo  [facility], [address] and [facility] [phone] Your Appointments 2018  9:45 AM  
FOLLOW UP EXAM with Austin Parikh MD  
[facility] ([facility]) Appt Note: routine f/u leg pain [address] [facility] [address] [facility] [address]  
  
    
 2018  8:15 AM  
FOLLOW UP EXAM with Austin Parikh MD  
[facility] ([facility]) Appt Note: routine f/u 3wks [address] 200 [facility]  
[phone] Upcoming Health Maintenance Date Due FOBT Q 1 YEAR AGE 50-75 6/15/2018 BREAST CANCER SCRN MAMMOGRAM 2018 PAP AKA CERVICAL CYTOLOGY 2019 DTaP/Tdap/Td series (2 - Td) 2023 Allergies as of 2018  Review Complete On: 2018 By: Aly Aceevdo MD  
 No Known Allergies Current Immunizations  Reviewed on 2013 Name Date Tdap 2013 Not reviewed this visit You Were Diagnosed With   
  
 Codes Comments Right knee pain, unspecified chronicity    -  Primary ICD-10-CM: M25.561 ICD-9-CM: 719.46 Low back pain, unspecified back pain laterality, unspecified chronicity, with sciatica presence unspecified     ICD-10-CM: M54.5 ICD-9-CM: 724.2 Lumbar disc disease     ICD-10-CM: M51.9 ICD-9-CM: 722.93 Vitals BP Pulse Height(growth percentile) Weight(growth percentile) SpO2 BMI  
 141/90 (BP 1 Location: Left arm) 72 5' 4\" (1.626 m) 192 lb 3.2 oz (87.2 kg) 100% 32.99 kg/m2 OB Status Smoking Status Perimenopausal Never Smoker BMI and BSA Data Body Mass Index Body Surface Area  
 32.99 kg/m 2 1.98 m 2 Preferred Pharmacy Pharmacy Name Phone [id number]  [address], 200 [facility] [place] [phone] Your Updated Medication List  
  
   
This list is accurate as of: 1/25/18  3:55 PM.  Always use your most recent med list.  
  
  
  
  
 cyclobenzaprine 10 mg tablet Commonly known as:  FLEXERIL Take 1 Tab by mouth three (3) times daily as needed for Muscle Spasm(s). methylPREDNISolone 4 mg tablet Commonly known as:  MEDROL (LAMAR) Per dose pack instructions  
  
 naproxen 500 mg tablet Commonly known as:  NAPROSYN Take 1 Tab by mouth two (2) times daily (with meals). SUMAtriptan 100 mg tablet Commonly known as:  IMITREX Take 1 Tab by mouth once as needed for Migraine for up to 1 dose. Can repeat in 2 hours if persists. No more than 2 tabs in 24 hours. We Performed the Following AMB POC XRAY, KNEE; 1/2 VIEWS [26620 CPT(R)] AMB POC XRAY, SPINE, LUMBOSACRAL; 2 O [36058 CPT(R)] Introducing [facility]! Luis Manuel Pinon introduces [email or web address] patient portal. Now you can access parts of your medical record, email your doctor's office, and request medication refills online. 1. In your internet browser, go to [email or web address] 2. Click on the First Time User? Click Here link in the Sign In box. You will see the New Member Sign Up page. 3. Enter your [email or web address] Access Code exactly as it appears below. You will not need to use this code after youve completed the sign-up process. If you do not sign up before the expiration date, you must request a new code. · [email or web address] Access Code: [email or web address] Expires: 4/16/2018  9:38 AM 
 
4. Enter the last four digits of your Social Security Number (xxxx) and Date of Birth (mm/dd/yyyy) as indicated and click Submit.  You will be taken to the next sign-up page. 5. Create a [employer] ID. This will be your [employer] login ID and cannot be changed, so think of one that is secure and easy to remember. 6. Create a [employer] password. You can change your password at any time. 7. Enter your Password Reset Question and Answer. This can be used at a later time if you forget your password. 8. Enter your e-mail address. You will receive e-mail notification when new information is available in [address] 9. Click Sign Up. You can now view and download portions of your medical record. 10. Click the Download Summary menu link to download a portable copy of your medical information. If you have questions, please visit the Frequently Asked Questions section of the [employer] website. Remember, [employer] is NOT to be used for urgent needs. For medical emergencies, dial 911. Now available from your iPhone and Android! Please provide this summary of care documentation to your next provider. Your primary care clinician is listed as Mia Rizvi. If you have any questions after today's visit, please call [phone].

## 2018-01-25 NOTE — PROGRESS NOTES
HISTORY OF PRESENT ILLNESS: Ms. Ayad Moffett is a 11-year-old female who is here for consultation regarding some right knee and thigh pain. About two months ago she started noticing some pain in her right knee. She and her friend have been doing some walking exercise and she noted some pain in the right knee. There was more diffuse pain in the anterior and medial and lateral aspect of the right knee. However on Monday she noted a sharp pain along the posterolateral aspect of her right leg. She could hardly walk at that time. She did not have any x-rays of her right knee. Her friend states that she has a lot of stiffness and when she gets up from a sitting position it takes her a while to get up and this is more because of some discomfort and stiffness in her low back. Other than the pain she had in her thigh this past week she has not had any radiating leg pain. She denies numbness and tingling in the lower extremities. She denies bladder or bowel dysfunction. She has not noticed any swelling of her knee. She has not had any recent x-rays of her right knee or her back. She denies mechanical locking symptoms of her right knee. She denies instability of her right knee. PHYSICAL EXAMINATION:  Reveals an overweight, 11-year-old female, currently in no obvious discomfort. In reference to the right knee, she has no effusion of the right knee. She has good range of motion of the right knee from full extension to flexion of about 130º. This is accompanied by mild crepitus in the patellofemoral area. She has no palpable medial or lateral joint line tenderness. Waynes test is negative. Lachman test is negative. She has good collateral ligament stability of the right knee with no opening to varus or valgus stress testing in full extension or 30º of flexion. Anterior and posterior drawer tests are negative.       With reference to her right hip, she has normal active and passive range of motion of the right hip without discomfort. Right hip roll test is negative. She does have slight tenderness to palpation in the region of the lateral hamstring muscle. This pain however is not aggravated by resisted right knee flexion. Straight leg raise test bilaterally is negative. RADIOGRAPHS: X-rays of the right knee today reveal no acute osseus pathology. She has good joint space remaining in the weightbearing portion of the right knee. There are no loose bodies present. X-rays of the lumbosacral spine today reveal minimal degenerative disc disease, particularly at the L5-S1 level, but she still has good disc space remaining at all levels. There is no spondylolisthesis evident. IMPRESSION:   1. Right knee strain. 2. Right hamstring strain. RECOMMENDATIONS:  At this point treatment remains symptomatic and conservative. She was prescribed an anti-inflammatory medication recently by her physician but she has not gotten this filled and I suggested that she do this. In addition I have recommended a course of physical therapy for her low back and strengthening the lower extremities. She will pursue this and return to see me again in a few months. If she has any problems in the meantime she is to notify me. All of her questions were answered today.                    Vitals:    01/25/18 1437   BP: 141/90   Pulse: 72   SpO2: 100%   Weight: 192 lb 3.2 oz (87.2 kg)   Height: 5' 4\" (1.626 m)   PainSc:   3       Patient Active Problem List   Diagnosis Code    Migraine G43.909    Vitamin D deficiency E55.9    Prediabetes R73.03    Colon cancer screening Z12.11    History of hypertension Z86.79     Patient Active Problem List    Diagnosis Date Noted    History of hypertension 01/22/2018    Colon cancer screening 09/21/2016    Prediabetes 11/09/2015    Vitamin D deficiency 04/17/2014    Migraine 02/01/2013     Current Outpatient Prescriptions   Medication Sig Dispense Refill    cyclobenzaprine (FLEXERIL) 10 mg tablet Take 1 Tab by mouth three (3) times daily as needed for Muscle Spasm(s). 30 Tab 0    naproxen (NAPROSYN) 500 mg tablet Take 1 Tab by mouth two (2) times daily (with meals). 30 Tab 0    SUMAtriptan (IMITREX) 100 mg tablet Take 1 Tab by mouth once as needed for Migraine for up to 1 dose. Can repeat in 2 hours if persists. No more than 2 tabs in 24 hours. 9 Tab 0    methylPREDNISolone (MEDROL, LAMAR,) 4 mg tablet Per dose pack instructions 1 Dose Pack 0     No Known Allergies  Past Medical History:   Diagnosis Date    Anxiety     Depression     medication briefly a few years ago    Hypertension     previously on medication, stopped it on own about a year ago    Migraine     prn excedrine migraine    Pap smear about 10 years ago    no abnormals    Prediabetes 5/2015    Screening mammogram last about 2007    Vitamin D deficiency      History reviewed. No pertinent surgical history.   Family History   Problem Relation Age of Onset    Cancer Mother      non-hodgkins     Cancer Sister 28     breast    Breast Cancer Sister 39    Breast Cancer Maternal Grandmother      Social History   Substance Use Topics    Smoking status: Never Smoker    Smokeless tobacco: Never Used    Alcohol use No

## 2018-02-16 ENCOUNTER — HOSPITAL ENCOUNTER (OUTPATIENT)
Dept: PHYSICAL THERAPY | Age: 53
Discharge: HOME | End: 2018-02-16
Payer: COMMERCIAL

## 2018-02-16 PROCEDURE — 97162 PT EVAL MOD COMPLEX 30 MIN: CPT

## 2018-02-16 PROCEDURE — 97140 MANUAL THERAPY 1/> REGIONS: CPT

## 2018-02-16 NOTE — PROGRESS NOTES
[address] [facility] AT [facility]  [address] [facility] [address] [address], [place], [place]  Phone [phone]  Fax [phone] / [address] [address] PHYSICAL THERAPY SERVICES  Patient Name: Mattie Quinonez : 1965   Medical   Diagnosis: Low back pain [M54.5]  Unspecified thoracic, thoracolumbar and lumbosacral intervertebral disc disorder [M51.9]  Pain in right knee [M25.561]  Strain of muscle, fascia and tendon of the posterior muscle group at thigh level, unspecified thigh, initial encounter [R35.303S] Treatment Diagnosis: M54.4   Onset Date: 2017     Referral Source: Waldo Stephens MD [facility]): 2018   Prior Hospitalization: See medical history Provider #: [id number]   Prior Level of Function: Functionally Independent    Comorbidities: Obesity and OA   Medications: Verified on Patient Summary List   The Plan of Care and following information is based on the information from the initial evaluation.   =================================================================================  Assessment / key information: 47year old female presents for physical therapy evaluation due to right knee and right lower extremity pain. Patient was referred by Sandrita Lester MD with ortho. Patient reports the pain started in 2017 as posterior knee pain. Patient has been seen by her PCP and ortho. Diagnosis from orthopedic MD include low back pain, lumbar disc disease, right knee pain, hamstring pain. Pain ranges from 2-10/10  in Right LE and knee. Patient reports she only has pain in low back with prolonged standing and then it is stiff. Patient does report a fall in 2017 onto both knees. 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 Patients goal is to decrease pain so that she can bend her knee. Negative for red flags.   FOTO 52/100  Patient was issued a HEP to initiate lumbosacral mobility and strength. Patient will benefit from an episode of skilled PT to address deficits and improve overall QOL. Functional limitations include difficulty with prolonged standing or walking, difficulty with sit to stand transfers and decreased ability to ascend and descend stairs.    Evaluation is significant for 1) Malalignment of Sacroiliac joint 2) Decreased Core and LE strength (see below),  3) Decreased flexibility in hamstrings, gastrocs and piriformis, 4) Tenderness to palpation along B Gluteals and Piriformis (RT>LT) 5) Decreased lumbar extension to 50% of normal range of motion     Strength    L(0-5) R (0-5) N/T   Hip Flexion (L1,2) 4 4 []   Knee Extension (L3,4) 4 4 []   Ankle Dorsiflexion (L4) 5 5 []   Great Toe Extension (L5)     []   Ankle Plantarflexion (S1) 5 5 []   Knee Flexion (S1,2) 4 4 []   Upper Abdominals 3 3 []   Lower Abdominals 3 3 []   Paraspinals 3 3 []   Back Rotators     []   Gluteus Shahriar 3 3 []   Other     []       =================================================================================  Eval Complexity: History: MEDIUM  Complexity : 1-2 comorbidities / personal factors will impact the outcome/ POC Exam:HIGH Complexity : 4+ Standardized tests and measures addressing body structure, function, activity limitation and / or participation in recreation  Presentation: MEDIUM Complexity : Evolving with changing characteristics  Clinical Decision Making:MEDIUM Complexity : FOTO score of 26-74Overall Complexity:MEDIUM  Problem List: pain affecting function, decrease strength, decrease ADL/ functional abilitiies, decrease activity tolerance and decrease flexibility/ joint mobility   Treatment Plan may include any combination of the following: Therapeutic exercise, Therapeutic activities, Neuromuscular re-education, Physical agent/modality, Manual therapy, Patient education and Self Care training  Patient / Family readiness to learn indicated by: interest  Persons(s) to be included in education: patient (P)  Barriers to Learning/Limitations: None  Measures taken:    Patient Goal (s): No more pain and to bend her knee    Patient self reported health status: good  Rehabilitation Potential: good  · Short Term Goals: To be accomplished in  3  treatments:  1. Patient will demonstrate [place] and compliance with HEP to demonstrate active role in recovery. 2. Patient will report decrease in pain level to 1/10 or less to allow for increased tolerance to activity.      · Long Term Goals: To be accomplished in  6  weeks:  3. Patient will increase core and Right LE strength to 5/5 to allow for return to PLOF. 4. Patient will present with normalized pelvic alignment without requiring a muscle energy technique for 3 consecutive visits to allow for increased I with functional activity. 5. Patient will increase FOTO score to 64/100 to demonstrate increased I with ADL. Frequency / Duration:   Patient to be seen  2  times per week for 4-6  weeks:  Patient / Caregiver education and instruction: exercises  G-Codes (GP): REY  Therapist Signature: Corina Eastman PT Date: 2/63/2330   Certification Period: NA Time: 12:28 PM   ===========================================================================================  I certify that the above Physical Therapy Services are being furnished while the patient is under my care. I agree with the treatment plan and certify that this therapy is necessary. Physician Signature:        Date:       Time:     Please sign and return to [facility] or you may fax the signed copy to [phone]. Thank you.

## 2018-02-20 ENCOUNTER — APPOINTMENT (OUTPATIENT)
Dept: PHYSICAL THERAPY | Age: 53
End: 2018-02-20
Payer: COMMERCIAL

## 2018-02-22 ENCOUNTER — HOSPITAL ENCOUNTER (OUTPATIENT)
Dept: PHYSICAL THERAPY | Age: 53
Discharge: HOME | End: 2018-02-22
Payer: COMMERCIAL

## 2018-02-22 PROCEDURE — 97110 THERAPEUTIC EXERCISES: CPT

## 2018-02-22 PROCEDURE — 97140 MANUAL THERAPY 1/> REGIONS: CPT

## 2018-02-22 NOTE — PROGRESS NOTES
PT DAILY TREATMENT NOTE/LUMBAR EVAL 3-16    Patient Name: Farnaz Love  Date:2018  : 1965  [x]  Patient  Verified  Payor: BLUE CROSS / Plan: [address] [place] [address] / Product Type: PPO /    In time:1015  Out time:1100  Total Treatment Time (min): 45  Visit #: 1 of     Treatment Area: Low back pain [M54.5]  Unspecified thoracic, thoracolumbar and lumbosacral intervertebral disc disorder [M51.9]  Pain in right knee [M25.561]  Strain of muscle, fascia and tendon of the posterior muscle group at thigh level, unspecified thigh, initial encounter [S76.319A]  SUBJECTIVE  Pain Level (0-10 scale): 6/10  [x]constant []intermittent []improving []worsening []no change since onset    Any medication changes, allergies to medications, adverse drug reactions, diagnosis change, or new procedure performed?: [x] No    [] Yes (see summary sheet for update)  Subjective functional status/changes:    47year old female presents for physical therapy evaluation due to right knee and lower extremity pain. Patient was referred by Gaby Hernandez MD with ortho. Patient reports the pain started in 2017 as posterior knee pain. Patient has been seen by her PCP and ortho. Diagnosis from orthopedic MD include low back pain, lumbar disc disease, right knee pain, hamstring pain. Pain ranges from 2-10 in Right LE and knee. Patient reports she only has pain in low back with prolonged standing and then it is stiff. Patient does report a fall in 2017 onto both knees. 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 Patients goal is to decrease pain so that she can bend her knee. Negative for red flags.   FOTO 52/100      OBJECTIVE/EXAMINATION    35 min [x]Eval                  []Re-Eval       10 min Manual Therapy:  Right upslip and posterior rotation -- corrected with MET   Rationale: decrease pain, increase ROM, increase tissue extensibility and decrease trigger points to restore function           With   [] TE   [] TA   [] neuro   [] other: Patient Education: [x] Issued HEP  For PPT, TA iso and bridges  [] Progressed/Changed HEP based on:   [] positioning   [] body mechanics   [] transfers   [] heat/ice application    [] other:      Other Objective/Functional Measures: + for pelvic obliquity with right upslip and posterior rotation     Physical Therapy Evaluation - Lumbar Spine ([facility])    SUBJECTIVE  Chief Complaint: Right LE pain     Mechanism of injury: unknown      General Health:  Red Flags Indicated? [] Yes    [x] No    Past History/Treatments:  OA-- patient has seeked advice from PCP and orthopedic MD for this current issue.      Diagnostic Tests: [] Lab work [] X-rays    [] CT [] MRI     [] Other:  Results:      OBJECTIVE  Posture:  Lateral Shift: [] R    [] L     [] +  [] -  Kyphosis: [] Increased [] Decreased   []  WNL  Lordosis:  [x] Increased [] Decreased   [] WNL  Pelvic symmetry: [] WNL    [x] Other:+ for pelvic obliquity with right upslip and posterior rotation     Gait:  [] Normal     [x] Abnormal:Decreased  Terminal knee extension on right     Active Movements: [] N/A   [] Too acute   [] Other:  ROM % AROM % PROM Comments:pain, area   Forward flexion 40-60 100%     Extension 20-30 50%     SB right 20-30 100%     SB left 20-30 100%     Rotation right 5-10 100%     Rotation left 5-10 100%       Dural Mobility:  SLR Sitting: [] R    [] L    [] +    [x] -  @ (degrees):           Supine: [] R    [] L    [] +    [x] -  @ (degrees):   Slump Test: [] R    [] L    [] +    [x] -  @ (degrees):   Prone Knee Bend: [] R    [] L    [] +    [] -     Palpation  [] Min  [x] Mod  [] Severe    Location:right piriformis and gluteals    Strength   L(0-5) R (0-5) N/T   Hip Flexion (L1,2) 4 4 []   Knee Extension (L3,4) 4 4 []   Ankle Dorsiflexion (L4) 5 5 []   Great Toe Extension (L5)   []   Ankle Plantarflexion (S1) 5 5 []   Knee Flexion (S1,2) 4 4 []   Upper Abdominals 3 3 []   Lower Abdominals 3 3 []   Paraspinals 3 3 []   Back Rotators   []   Gluteus Shahriar 3 3 []   Other   []     Special Tests    + for pelvic obliquity with right upslip and posterior rotation    Global Muscular Weakness:  Core Strength 3/5      Pain Level (0-10 scale) post treatment: 4/10    ASSESSMENT/Changes in Function:   Patient will continue to benefit from skilled PT services to address functional mobility deficits, address ROM deficits, address strength deficits and analyze and address soft tissue restrictions to attain remaining goals. [x]  See Plan of Care         Progress towards goals / Updated goals: · Short Term Goals: To be accomplished in  3  treatments:  1. Patient will demonstrate [place] and compliance with HEP to demonstrate active role in recovery. 2. Patient will report decrease in pain level to 1/10 or less to allow for increased tolerance to activity. · Long Term Goals: To be accomplished in  6  weeks:  3. Patient will increase core and Right LE strength to 5/5 to allow for return to PLOF. 4. Patient will present with normalized pelvic alignment without requiring a muscle energy technique for 3 consecutive visits to allow for increased I with functional activity. 5. Patient will increase FOTO score to 64/100 to demonstrate increased I with ADL.      PLAN  [x]  Other: 2 times a week for 4-6 weeks    Justification for Eval Code Complexity:  Patient History : Medium for 1 co morbidity   Examination see exam High for measurements   Clinical Presentation: Medium   Clinical Decision Making : FOTO : 46 /100    Venessa Way, PT 2/22/2018  11:42 AM

## 2018-02-22 NOTE — PROGRESS NOTES
PT DAILY TREATMENT NOTE     Patient Name: Jaqueline Gardner  Date:2018  : 1965  [x]  Patient  Verified  Payor: [employer] [place] / Plan: [address] [place] [address] / Product Type: PPO /    In time:1205  Out time:110  Total Treatment Time (min): 65  Visit #: 2 of     Treatment Area: Low back pain [M54.5]  Unspecified thoracic, thoracolumbar and lumbosacral intervertebral disc disorder [M51.9]  Pain in right knee [M25.561]  Strain of muscle, fascia and tendon of the posterior muscle group at thigh level, unspecified thigh, initial encounter [S76.889A]    SUBJECTIVE  Pain Level (0-10 scale): 3/10 LBP 3/10 right knee     Any medication changes, allergies to medications, adverse drug reactions, diagnosis change, or new procedure performed?: [x] No    [] Yes (see summary sheet for update)  Subjective functional status/changes:   [] No changes reported  About 3 hours after I left here last time I started having back pain. I have never had that before.  The pain in my leg is less     OBJECTIVE  Modality rationale: decrease pain and increase tissue extensibility to improve the patients ability to tolerate prolonged standing for work    Lesson[employer] Type Additional Details    [] Estim: []Att   []Unatt        []TENS instruct                  []IFC  []Premod   []NMES                     []Other:  []w/US   []w/ice   []w/heat  Position:  Location:    []  Traction: [] Cervical       []Lumbar                       [] Prone          []Supine                       []Intermittent   []Continuous Lbs:  [] before manual  [] after manual    []  Ultrasound: []Continuous   [] Pulsed                           []1MHz   []3MHz Location:  W/cm2:    []  Iontophoresis with dexamethasone         Location: [] Take home patch   [] In clinic   15 []  Ice     [x]  heat  []  Ice massage Position:posterior right LE  Location: prone    []  Vasopneumatic Device Pressure:       [] lo [] med [] hi   Temperature: [] lo [] med [] hi   [x] Skin assessment post-treatment:  [x]intact []redness- no adverse reaction       []redness  adverse reaction:       30/25 min Therapeutic Exercise:  [x] See flow sheet :Initiated POC    Rationale: increase ROM and increase strength to improve the patients ability to tolerate work related activites. 20 min Manual Therapy:  Right upslip and posterior rotation , MET to correct,  Piriformis release B Rt>Lt,    Rationale: decrease pain, increase ROM, increase tissue extensibility and decrease trigger points to increase tolerance to walking            min Patient Education: [x] Review HEP    [] Progressed/Changed HEP based on:   [] positioning   [] body mechanics   [] transfers   [] heat/ice application        Other Objective/Functional Measures: Right LE shorter in supine to sit test indicating pelvic malaignment     Pain Level (0-10 scale) post treatment: 3/10    ASSESSMENT/Changes in Function: Patient tolerated manual and therex well today. Plan of care was initiated due to first visit since [facility]. Patient reports compliance with HEP. Patient will continue to benefit from skilled PT services to modify and progress therapeutic interventions, address functional mobility deficits, address ROM deficits, address strength deficits and analyze and address soft tissue restrictions to attain remaining goals.         Progress towards goals / Updated goals:  First visit from [address]  [x]  Upgrade activities as tolerated     [x]  Continue plan of care    Marce Nichole, PT 2/22/2018  12:20 PM

## 2018-02-28 ENCOUNTER — HOSPITAL ENCOUNTER (OUTPATIENT)
Dept: PHYSICAL THERAPY | Age: 53
Discharge: HOME | End: 2018-02-28
Payer: COMMERCIAL

## 2018-02-28 PROCEDURE — 97110 THERAPEUTIC EXERCISES: CPT

## 2018-02-28 PROCEDURE — 97140 MANUAL THERAPY 1/> REGIONS: CPT

## 2018-02-28 NOTE — PROGRESS NOTES
PT DAILY TREATMENT NOTE     Patient Name: Luisana Martin  Date:2018  : 1965  [x]  Patient  Verified  Payor: BLUE CROSS / Plan: [address] [place] [address] / Product Type: PPO /    In time: 9:00  Out time: 10:05   Total Treatment Time (min): 65  Visit #:  3 of     Treatment Area: Low back pain [M54.5]  Unspecified thoracic, thoracolumbar and lumbosacral intervertebral disc disorder [M51.9]  Pain in right knee [M25.561]  Strain of muscle, fascia and tendon of the posterior muscle group at thigh level, unspecified thigh, initial encounter [S76.250A]    SUBJECTIVE  Pain Level (0-10 scale): 0/10 L/S, 5/10 right LE behind the knee    Any medication changes, allergies to medications, adverse drug reactions, diagnosis change, or new procedure performed?: [x] No    [] Yes (see summary sheet for update)  Subjective functional status/changes:   [] No changes reported  My right leg is hurting again. They keep saying it is my back but I really think it is my knee. After an hour at work I am hurting and limping.     OBJECTIVE  Modality rationale: decrease pain and increase tissue extensibility to improve the patients ability to tolerate prolonged standing for work    [employer] Type Additional Details    [] Estim: []Att   []Unatt        []TENS instruct                  []IFC  []Premod   []NMES                     []Other:  []w/US   []w/ice   []w/heat  Position:  Location:    []  Traction: [] Cervical       []Lumbar                       [] Prone          []Supine                       []Intermittent   []Continuous Lbs:  [] before manual  [] after manual    []  Ultrasound: []Continuous   [] Pulsed                           []1MHz   []3MHz Location:  W/cm2:    []  Iontophoresis with dexamethasone         Location: [] Take home patch   [] In clinic   15 []  Ice     [x]  heat  []  Ice massage Position:posterior right LE  Location: prone    []  Vasopneumatic Device Pressure:       [] lo [] med [] hi   Temperature: [] lo [] med [] hi   [x] Skin assessment post-treatment:  [x]intact []redness- no adverse reaction       []redness  adverse reaction:       40/35 min Therapeutic Exercise:  [x] See flow sheet: Bike warmup x 5' NC   Rationale: increase ROM and increase strength to improve the patients ability to tolerate work related activites. 10 min Manual Therapy:  Right upslip and posterior rotation, MET to correct. Rationale: decrease pain, increase ROM, increase tissue extensibility and decrease trigger points to increase tolerance to walking            min Patient Education: [x] Review HEP    [] Progressed/Changed HEP based on:   [] positioning   [] body mechanics   [] transfers   [] heat/ice application        Other Objective/Functional Measures: Right LE shorter in supine to sit test indicating pelvic malalignment     Pain Level (0-10 scale) post treatment: 6/10    ASSESSMENT/Changes in Function:   Patient tolerated manual and therex well today. Pt with moderate difficulty performing PPT requiring verbal and tactile cuing initially. Pt required verbal cuing not to push through UEs with bridging, and for sequencing with bridging with ball/band. Patient will continue to benefit from skilled PT services to modify and progress therapeutic interventions, address functional mobility deficits, address ROM deficits, address strength deficits and analyze and address soft tissue restrictions to attain remaining goals. Progress towards goals / Updated goals:     Short Term Goals: To be accomplished in  3  treatments:  1. Patient will demonstrate [place] and compliance with HEP to demonstrate active role in recovery. Current: Pt reports daily compliance, except for yesterday. 2-28-18  2. Patient will report decrease in pain level to 1/10 or less to allow for increased tolerance to activity. Long Term Goals: To be accomplished in  6  weeks:  3.  Patient will increase core and Right LE strength to 5/5 to allow for return to PLOF. 4. Patient will present with normalized pelvic alignment without requiring a muscle energy technique for 3 consecutive visits to allow for increased I with functional activity. 5. Patient will increase FOTO score to 64/100 to demonstrate increased I with ADL.      PLAN  [x]  Upgrade activities as tolerated     [x]  Continue plan of care    FRANCINE Braun 2/28/2018  10:00 AM

## 2018-03-05 ENCOUNTER — HOSPITAL ENCOUNTER (OUTPATIENT)
Dept: PHYSICAL THERAPY | Age: 53
Discharge: HOME | End: 2018-03-05
Payer: COMMERCIAL

## 2018-03-05 PROCEDURE — 97140 MANUAL THERAPY 1/> REGIONS: CPT

## 2018-03-05 PROCEDURE — 97110 THERAPEUTIC EXERCISES: CPT

## 2018-03-05 NOTE — PROGRESS NOTES
PT DAILY TREATMENT NOTE     Patient Name: Dee Leavitt  Date:3/5/2018  : 1965  [x]  Patient  Verified  Payor: KYLE SABRINA / Plan: [address] [place] [address] / Product Type: PPO /    In time:9:03  Out time: 9:53   Total Treatment Time (min): 50  Visit #:  4 of     Treatment Area: Low back pain [M54.5]  Unspecified thoracic, thoracolumbar and lumbosacral intervertebral disc disorder [M51.9]  Pain in right knee [M25.561]  Strain of muscle, fascia and tendon of the posterior muscle group at thigh level, unspecified thigh, initial encounter [S79.109A]    SUBJECTIVE  Pain Level (0-10 scale): 0/10 L/S, 2/10 right knee    Any medication changes, allergies to medications, adverse drug reactions, diagnosis change, or new procedure performed?: [x] No    [] Yes (see summary sheet for update)  Subjective functional status/changes:   [] No changes reported  When I laid on my stomach the other time I was here it caused my knee to hurt more     OBJECTIVE  Modality rationale: decrease pain and increase tissue extensibility to improve the patients ability to tolerate prolonged standing for work    Alessandra Holter Type Additional Details    [] Estim: []Att   []Unatt        []TENS instruct                  []IFC  []Premod   []NMES                     []Other:  []w/US   []w/ice   []w/heat  Position:  Location:    []  Traction: [] Cervical       []Lumbar                       [] Prone          []Supine                       []Intermittent   []Continuous Lbs:  [] before manual  [] after manual    []  Ultrasound: []Continuous   [] Pulsed                           []1MHz   []3MHz Location:  W/cm2:    []  Iontophoresis with dexamethasone         Location: [] Take home patch   [] In clinic    []  Ice     []  heat  []  Ice massage Position:posterior right LE  Location: prone    []  Vasopneumatic Device Pressure:       [] lo [] med [] hi   Temperature: [] lo [] med [] hi   [x] Skin assessment post-treatment:  [x]intact []redness- no adverse reaction       []redness  adverse reaction:       40/35 min Therapeutic Exercise:  [x] See flow sheet: Bike warmup x 5' NC  Held prone based exercises secondary causing increase pain  Added forward sitting with trunk flexion and SB   Rationale: increase ROM and increase strength to improve the patients ability to tolerate work related activites. 10 min Manual Therapy:  Right upslip and posterior rotation, MET to correct. Rationale: decrease pain, increase ROM, increase tissue extensibility and decrease trigger points to increase tolerance to walking            min Patient Education: [x] Review HEP    [] Progressed/Changed HEP based on:   [] positioning   [] body mechanics   [] transfers   [] heat/ice application        Other Objective/Functional Measures:SI dysfunction - (R) posterior rotated and (R) up-slip - MET to correct and leg pull - held extension based exercises secondary to causing increase knee pain with laying in prone with hip IR and ER  Encouraged patient to attempt forward flexion at work if patient reports increase knee pain and assess if that decreases the knee pain as well as placing foot inside cabinet with prolong standing washing dishes and cooking     Pain Level (0-10 scale) post treatment: 0/10    ASSESSMENT/Changes in Function:       Patient will continue to benefit from skilled PT services to modify and progress therapeutic interventions, address functional mobility deficits, address ROM deficits, address strength deficits and analyze and address soft tissue restrictions to attain remaining goals. Progress towards goals / Updated goals:     Short Term Goals: To be accomplished in  3  treatments:  1. Patient will demonstrate [place] and compliance with HEP to demonstrate active role in recovery. Current: Pt reports daily compliance, except for yesterday. 2-28-18  2.  Patient will report decrease in pain level to 1/10 or less to allow for increased tolerance to activity. Long Term Goals: To be accomplished in  6  weeks:  3. Patient will increase core and Right LE strength to 5/5 to allow for return to PLOF. 4. Patient will present with normalized pelvic alignment without requiring a muscle energy technique for 3 consecutive visits to allow for increased I with functional activity. 5. Patient will increase FOTO score to 64/100 to demonstrate increased I with ADL.      PLAN  [x]  Upgrade activities as tolerated     [x]  Continue plan of care    Jennifer Burton PTA, FRANCINE 3/5/2018  10:00 AM

## 2018-03-07 ENCOUNTER — HOSPITAL ENCOUNTER (OUTPATIENT)
Dept: PHYSICAL THERAPY | Age: 53
End: 2018-03-07
Payer: COMMERCIAL

## 2018-05-29 NOTE — PROGRESS NOTES
[address] [facility]  [address] [facility] [address] [address],[address], [facility] [place], [address]  Phone [phone]  Fax [phone]  DISCHARGE SUMMARY  Patient Name: Bri Patel : 1965   Treatment/Medical Diagnosis: Low back pain [M54.5]  Unspecified thoracic, thoracolumbar and lumbosacral intervertebral disc disorder [M51.9]  Pain in right knee [M25.561]  Strain of muscle, fascia and tendon of the posterior muscle group at thigh level, unspecified thigh, initial encounter [W00.114V]   Referral Source: Sandra Price MD     Date of Initial Visit: 2018 Attended Visits: 4 Missed Visits: 4       Summary of Care: Thank you for referring this pleasant patient. Dominick Jaimes has completed 4 of 8-12 proposed sessions   Patient did not return to therapy after her 4th visit - reason -unknown  Will d/c patient at this time secondary to non-compliance   Will advise patient, is she contacts office, that if any additional follow up or recommendation is needed to return to referring physician. Reason for Discharge:  [x] The patient was non-compliant with attendance. [] The patient could not be contacted to schedule further therapy sessions. [] The patient has been discharged based on the orders of the referring physician.  [] The patient has requested to be discharged due to:   [] Patient has met all goals or max benefit has been achieved      If you have any questions/comments please contact us directly at [phone]. Thank you for allowing us to assist in the care of your patient.     Therapist Signature: Paul Ward PTA Date: 2018     Time: 2:21 PM

## 2019-07-12 ENCOUNTER — HOSPITAL ENCOUNTER (OUTPATIENT)
Dept: MAMMOGRAPHY | Age: 54
Discharge: HOME | End: 2019-07-12
Attending: FAMILY MEDICINE
Payer: COMMERCIAL

## 2019-07-12 DIAGNOSIS — Z12.31 VISIT FOR SCREENING MAMMOGRAM: ICD-10-CM

## 2019-07-12 PROCEDURE — 77067 SCR MAMMO BI INCL CAD: CPT

## 2020-03-19 ENCOUNTER — OFFICE VISIT (OUTPATIENT)
Dept: FAMILY MEDICINE CLINIC | Age: 55
End: 2020-03-19

## 2020-03-19 ENCOUNTER — HOSPITAL ENCOUNTER (OUTPATIENT)
Dept: LAB | Age: 55
Discharge: HOME | End: 2020-03-19
Payer: COMMERCIAL

## 2020-03-19 VITALS
RESPIRATION RATE: 16 BRPM | DIASTOLIC BLOOD PRESSURE: 78 MMHG | BODY MASS INDEX: 32.78 KG/M2 | HEART RATE: 65 BPM | OXYGEN SATURATION: 98 % | HEIGHT: 64 IN | TEMPERATURE: 98.1 F | WEIGHT: 192 LBS | SYSTOLIC BLOOD PRESSURE: 126 MMHG

## 2020-03-19 DIAGNOSIS — Z01.419 WELL WOMAN EXAM WITH ROUTINE GYNECOLOGICAL EXAM: Primary | ICD-10-CM

## 2020-03-19 DIAGNOSIS — Z12.11 COLON CANCER SCREENING: ICD-10-CM

## 2020-03-19 DIAGNOSIS — E55.9 VITAMIN D DEFICIENCY: ICD-10-CM

## 2020-03-19 DIAGNOSIS — F33.0 DEPRESSION, MAJOR, RECURRENT, MILD (HCC): ICD-10-CM

## 2020-03-19 DIAGNOSIS — R73.03 PREDIABETES: ICD-10-CM

## 2020-03-19 DIAGNOSIS — E78.9 BORDERLINE HIGH CHOLESTEROL: ICD-10-CM

## 2020-03-19 DIAGNOSIS — Z12.4 SCREENING FOR MALIGNANT NEOPLASM OF THE CERVIX: ICD-10-CM

## 2020-03-19 PROCEDURE — 88142 CYTOPATH C/V THIN LAYER: CPT

## 2020-03-19 PROCEDURE — 87624 HPV HI-RISK TYP POOLED RSLT: CPT

## 2020-03-19 RX ORDER — SERTRALINE HYDROCHLORIDE 25 MG/1
25 TABLET, FILM COATED ORAL DAILY
Qty: 90 TAB | Refills: 0 | Status: SHIPPED | OUTPATIENT
Start: 2020-03-19 | End: 2020-04-27 | Stop reason: DRUGHIGH

## 2020-03-19 RX ORDER — ZOSTER VACCINE RECOMBINANT, ADJUVANTED 50 MCG/0.5
0.5 KIT INTRAMUSCULAR ONCE
Qty: 0.5 ML | Refills: 1 | Status: SHIPPED | OUTPATIENT
Start: 2020-03-19 | End: 2020-03-19

## 2020-03-19 NOTE — PROGRESS NOTES
1. Have you been to the ER, urgent care clinic since your last visit? Hospitalized since your last visit? No    2. Have you seen or consulted any other health care providers outside of the [address] [place] [address] since your last visit? Include any pap smears or colon screening.  No     last Pap 09/07/2016  mammo 07/12/2019  Tdap 02/01/2013

## 2020-03-19 NOTE — PATIENT INSTRUCTIONS

## 2020-04-14 ENCOUNTER — HOSPITAL ENCOUNTER (OUTPATIENT)
Dept: LAB | Age: 55
Discharge: HOME | End: 2020-04-14
Payer: COMMERCIAL

## 2020-04-14 DIAGNOSIS — E78.9 BORDERLINE HIGH CHOLESTEROL: ICD-10-CM

## 2020-04-14 DIAGNOSIS — R73.03 PREDIABETES: ICD-10-CM

## 2020-04-14 DIAGNOSIS — E55.9 VITAMIN D DEFICIENCY: ICD-10-CM

## 2020-04-14 LAB
25(OH)D3 SERPL-MCNC: 22.8 NG/ML (ref 30–100)
ALBUMIN SERPL-MCNC: 4 G/DL (ref 3.4–5)
ALBUMIN/GLOB SERPL: 1.1 {RATIO} (ref 0.8–1.7)
ALP SERPL-CCNC: 83 U/L (ref 45–117)
ALT SERPL-CCNC: 26 U/L (ref 13–56)
ANION GAP SERPL CALC-SCNC: 4 MMOL/L (ref 3–18)
AST SERPL-CCNC: 16 U/L (ref 10–38)
BILIRUB SERPL-MCNC: 0.7 MG/DL (ref 0.2–1)
BUN SERPL-MCNC: 16 MG/DL (ref 7–18)
BUN/CREAT SERPL: 21 (ref 12–20)
CALCIUM SERPL-MCNC: 9.1 MG/DL (ref 8.5–10.1)
CHLORIDE SERPL-SCNC: 107 MMOL/L (ref 100–111)
CHOLEST SERPL-MCNC: 223 MG/DL
CO2 SERPL-SCNC: 31 MMOL/L (ref 21–32)
CREAT SERPL-MCNC: 0.75 MG/DL (ref 0.6–1.3)
GLOBULIN SER CALC-MCNC: 3.7 G/DL (ref 2–4)
GLUCOSE SERPL-MCNC: 91 MG/DL (ref 74–99)
HBA1C MFR BLD: 5.5 % (ref 4.2–5.6)
HDLC SERPL-MCNC: 51 MG/DL (ref 40–60)
HDLC SERPL: 4.4 {RATIO} (ref 0–5)
LDLC SERPL CALC-MCNC: 145.6 MG/DL (ref 0–100)
LIPID PROFILE,FLP: ABNORMAL
POTASSIUM SERPL-SCNC: 4.6 MMOL/L (ref 3.5–5.5)
PROT SERPL-MCNC: 7.7 G/DL (ref 6.4–8.2)
SODIUM SERPL-SCNC: 142 MMOL/L (ref 136–145)
TRIGL SERPL-MCNC: 132 MG/DL (ref ?–150)
VLDLC SERPL CALC-MCNC: 26.4 MG/DL

## 2020-04-14 PROCEDURE — 82306 VITAMIN D 25 HYDROXY: CPT

## 2020-04-14 PROCEDURE — 80053 COMPREHEN METABOLIC PANEL: CPT

## 2020-04-14 PROCEDURE — 36415 COLL VENOUS BLD VENIPUNCTURE: CPT

## 2020-04-14 PROCEDURE — 80061 LIPID PANEL: CPT

## 2020-04-14 PROCEDURE — 83036 HEMOGLOBIN GLYCOSYLATED A1C: CPT

## 2020-04-21 ENCOUNTER — TELEPHONE (OUTPATIENT)
Dept: FAMILY MEDICINE CLINIC | Age: 55
End: 2020-04-21

## 2020-04-21 NOTE — TELEPHONE ENCOUNTER
Pt had to cancel her appt for 4/23/2020 with Dr Korin Palma she has her grand kids this week. She states that she did have the labs done and would like to know if someone can call and give her the results instead of having the appt. Please advise.

## 2020-04-21 NOTE — TELEPHONE ENCOUNTER
Vitamin d is mildly low, advise 2000 iu vitamin d OD otc  Cholesterol is borderline high  I wanted to ff-up with her depression, and hoping we can reschedule VV to discuss in detail  pls notify pt.

## 2020-04-21 NOTE — PROGRESS NOTES
Subjective:   53 y.o. female for Well Woman Check. No LMP recorded. Patient is perimenopausal.       H/o HTN- that is not diet controlled. Off medications for few years now  Prediabetes-reports some dietary inconsistencies  Migraine headaches-headache free for about a year now. Depression- c/o low mood and fatigue. Interested in starting medication. Patient Active Problem List    Diagnosis Date Noted    [facility] 03/19/2020    History of hypertension 01/22/2018    Colon cancer screening 09/21/2016    Prediabetes 11/09/2015    Vitamin D deficiency 04/17/2014    Migraine 02/01/2013     Current Outpatient Medications   Medication Sig Dispense Refill    sertraline (ZOLOFT) 25 mg tablet Take 1 Tab by mouth daily. 80 Tab 0     No Known Allergies  Past Medical History:   Diagnosis Date    Anxiety     Depression     medication briefly a few years ago    Hypertension     previously on medication, stopped it on own about a year ago    Migraine     prn excedrine migraine    Pap smear about 10 years ago    no abnormals    Prediabetes 5/2015    Screening mammogram last about 2007    Vitamin D deficiency      No past surgical history on file. Family History   Problem Relation Age of Onset    Cancer Mother         non-hodgkins     Cancer Sister 28        breast    Breast Cancer Sister 39    Breast Cancer Maternal Grandmother      Social History     Tobacco Use    Smoking status: Never Smoker    Smokeless tobacco: Never Used   Substance Use Topics    Alcohol use: No        ROS:  Feeling well. No dyspnea or chest pain on exertion. No abdominal pain, change in bowel habits, black or bloody stools. No urinary tract symptoms. GYN ROS: no breast pain or new or enlarging lumps on self exam. No neurological complaints.     Objective:     Visit Vitals  /78 (BP 1 Location: Left arm, BP Patient Position: Sitting)   Pulse 65   Temp 98.1 °F (36.7 °C) (Oral)   Resp 16   Ht 5' 4\" (1.626 m)   Wt 192 lb (87.1 kg)   SpO2 98%   BMI 32.96 kg/m²     The patient appears well, alert, oriented x 3, in no distress. ENT normal.  Neck supple. No adenopathy or thyromegaly. JIAN. Lungs are clear, good air entry, no wheezes, rhonchi or rales. S1 and S2 normal, no murmurs, regular rate and rhythm. Abdomen soft without tenderness, guarding, mass or organomegaly. Extremities show no edema, normal peripheral pulses. Neurological is normal, no focal findings. BREAST EXAM: breasts appear normal, no suspicious masses, no skin or nipple changes or axillary nodes    PELVIC EXAM: normal external genitalia, vulva, vagina, cervix, uterus and adnexa    Assessment/Plan:   Diagnoses and all orders for this visit:    1. Well woman exam with routine gynecological exam  well woman  Mammogram- 7/2019  pap smear- done today  return annually or prn  reviewed diet, exercise and weight control. CRCS due, referral placed  shingrix discussed, provided rx  tdap 2013    2. Prediabetes  H/o recheck  -     METABOLIC PANEL, COMPREHENSIVE; Future  -     HEMOGLOBIN A1C W/O EAG; Future    3. Vitamin D deficiency  H/o recheck  -     VITAMIN D, 25 HYDROXY; Future    4. Borderline high cholesterol  -     LIPID PANEL; Future    5. Depression, major, recurrent, mild (HCC)  Start zoloft 25 mg    6. Colon cancer screening  -     REFERRAL TO COLON AND RECTAL SURGERY    7. Screening for malignant neoplasm of the cervix  -     PAP LB, HPV RFX HPV 16&18,45([id number]); Future    Other orders  -     sertraline (ZOLOFT) 25 mg tablet; Take 1 Tab by mouth daily. -     varicella-zoster recombinant, PF, (Shingrix, PF,) 50 mcg/0.5 mL susr injection; 0.5 mL by IntraMUSCular route once for 1 dose. Ff-up in 4 weeks or sooner prn    Patient/guardian understands plan of care. Patient has provided input and agrees with goals. Future labs to be discussed on next visit.

## 2020-04-22 NOTE — TELEPHONE ENCOUNTER
Patient identified with 2 identifiers (name and ). patient is aware of lab results.  Patient hs been scheduled a vv for 2020 at 10:00 am.

## 2020-04-27 ENCOUNTER — VIRTUAL VISIT (OUTPATIENT)
Dept: FAMILY MEDICINE CLINIC | Age: 55
End: 2020-04-27

## 2020-04-27 DIAGNOSIS — E55.9 VITAMIN D DEFICIENCY: ICD-10-CM

## 2020-04-27 DIAGNOSIS — Z86.79 HISTORY OF HYPERTENSION: ICD-10-CM

## 2020-04-27 DIAGNOSIS — F33.0 DEPRESSION, MAJOR, RECURRENT, MILD (HCC): Primary | ICD-10-CM

## 2020-04-27 DIAGNOSIS — R73.03 PREDIABETES: ICD-10-CM

## 2020-04-27 DIAGNOSIS — G43.009 MIGRAINE WITHOUT AURA AND WITHOUT STATUS MIGRAINOSUS, NOT INTRACTABLE: ICD-10-CM

## 2020-04-27 DIAGNOSIS — E78.9 BORDERLINE HIGH CHOLESTEROL: ICD-10-CM

## 2020-04-27 RX ORDER — ACETAMINOPHEN 500 MG
2000 TABLET ORAL
Qty: 90 CAP | Refills: 3 | Status: SHIPPED | OUTPATIENT
Start: 2020-04-27 | End: 2022-03-03

## 2020-04-27 RX ORDER — SERTRALINE HYDROCHLORIDE 50 MG/1
50 TABLET, FILM COATED ORAL DAILY
Qty: 90 TAB | Refills: 0 | Status: SHIPPED | OUTPATIENT
Start: 2020-04-27 | End: 2022-01-27 | Stop reason: ALTCHOICE

## 2020-04-27 NOTE — PROGRESS NOTES
Kailee Avilez is a 53 y.o. female who was seen by synchronous (real-time) audio-video technology on 4/27/2020. Consent: Kailee Avilez, who was seen by synchronous (real-time) audio-video technology, and/or her healthcare decision maker, is aware that this patient-initiated, Telehealth encounter on 4/27/2020 is a billable service, with coverage as determined by her insurance carrier. She is aware that she may receive a bill and has provided verbal consent to proceed: Yes. 712  Subjective:   Kailee Avilez is a 53 y.o. female who was seen for No chief complaint on file. ff-up    Depression- started on zoloft last month, reports some improvement with less frequent crying spells. She stopped taking medication a week ago, unsure why exactly, reports no side effects. She has now experienced fatigue and anhedonia again. Denies harmful ideation  Vitamin D def- reviewed labs, has not yet taken supplement  Borderline high cholesterol- says father with h/o CAD in his 66's. She has h/o htn, not currently on any medication, non smoker. Prior to Admission medications    Medication Sig Start Date End Date Taking? Authorizing Provider   cholecalciferol (VITAMIN D3) (2,000 UNITS /50 MCG) cap capsule Take 2,000 Units by mouth once over twenty-four (24) hours. 4/27/20  Yes Josue Leyva MD   sertraline (ZOLOFT) 50 mg tablet Take 1 Tab by mouth daily. 4/27/20  Yes Josue Leyva MD   sertraline (ZOLOFT) 25 mg tablet Take 1 Tab by mouth daily. 3/19/20 4/27/20  Josue Leyva MD     No Known Allergies        ROS      Objective: There were no vitals taken for this visit.    General: alert, cooperative, no distress   Mental  status: normal mood, behavior, speech, dress, motor activity, and thought processes, able to follow commands   HENT: NCAT   Neck: no visualized mass   Resp: no respiratory distress   Neuro: no gross deficits   Skin: no discoloration or lesions of concern on visible areas Psychiatric: normal affect, consistent with stated mood, no evidence of hallucinations     Additional exam findings:   Results for orders placed or performed during the hospital encounter of 04/14/20   VITAMIN D, 25 HYDROXY   Result Value Ref Range    Vitamin D 25-Hydroxy 22.8 (L) 30 - 357 ng/mL   METABOLIC PANEL, COMPREHENSIVE   Result Value Ref Range    Sodium 142 136 - 145 mmol/L    Potassium 4.6 3.5 - 5.5 mmol/L    Chloride 107 100 - 111 mmol/L    CO2 31 21 - 32 mmol/L    Anion gap 4 3.0 - 18 mmol/L    Glucose 91 74 - 99 mg/dL    BUN 16 7.0 - 18 MG/DL    Creatinine 0.75 0.6 - 1.3 MG/DL    BUN/Creatinine ratio 21 (H) 12 - 20      GFR est AA >60 >60 ml/min/1.73m2    GFR est non-AA >60 >60 ml/min/1.73m2    Calcium 9.1 8.5 - 10.1 MG/DL    Bilirubin, total 0.7 0.2 - 1.0 MG/DL    ALT (SGPT) 26 13 - 56 U/L    AST (SGOT) 16 10 - 38 U/L    Alk. phosphatase 83 45 - 117 U/L    Protein, total 7.7 6.4 - 8.2 g/dL    Albumin 4.0 3.4 - 5.0 g/dL    Globulin 3.7 2.0 - 4.0 g/dL    A-G Ratio 1.1 0.8 - 1.7     LIPID PANEL   Result Value Ref Range    LIPID PROFILE          Cholesterol, total 223 (H) <200 MG/DL    Triglyceride 132 <150 MG/DL    HDL Cholesterol 51 40 - 60 MG/DL    LDL, calculated 145.6 (H) 0 - 100 MG/DL    VLDL, calculated 26.4 MG/DL    CHOL/HDL Ratio 4.4 0 - 5.0     HEMOGLOBIN A1C W/O EAG   Result Value Ref Range    Hemoglobin A1c 5.5 4.2 - 5.6 %         We discussed the expected course, resolution and complications of the diagnosis(es) in detail. Medication risks, benefits, costs, interactions, and alternatives were discussed as indicated. I advised her to contact the office if her condition worsens, changes or fails to improve as anticipated. She expressed understanding with the diagnosis(es) and plan. Marianna Romero is a 53 y.o. female who was evaluated by a video visit encounter for concerns as above. Patient identification was verified prior to start of the visit.  A caregiver was present when appropriate. Due to this being a TeleHealth encounter (During YONCG-08 public health emergency), evaluation of the following organ systems was limited: Vitals/Constitutional/EENT/Resp/CV/GI//MS/Neuro/Skin/Heme-Lymph-Imm. Pursuant to the emergency declaration under the [address] [place] [address] [facility] authority and the [employer] Act, this Virtual  Visit was conducted, with patient's (and/or legal guardian's) consent, to reduce the patient's risk of exposure to COVID-19 and provide necessary medical care. Services were provided through a video synchronous discussion virtually to substitute for in-person clinic visit. Patient and provider were located at their individual homes. Diagnoses and all orders for this visit:    1. Depression, major, recurrent, mild (HCC)  Needs better control  Increase zoloft to 50 mg  Cont daily exercise    2. Vitamin D deficiency  Mild  Replete:  -     cholecalciferol (VITAMIN D3) (2,000 UNITS /50 MCG) cap capsule; Take 2,000 Units by mouth once over twenty-four (24) hours. -     VITAMIN D, 25 HYDROXY; Future    3. Borderline high cholesterol  Calc cv risk 2.1 % not accounting for fam h/o CAD  We agreed to work on lifestyle changes  Recheck labs in 3 months prior to July visit  -     LIPID PANEL; Future    4. History of hypertension  monitroing    5. Prediabetes  Improved  Monitoring    6. Migraine without aura and without status migrainosus, not intractable  Asymptomatic  Monitoring    Other orders    -     sertraline (ZOLOFT) 50 mg tablet; Take 1 Tab by mouth daily.     Ff-up in 4 weeks as VV for depression reeval  Ff-up in 3 months for routine, labs prior    Sharda Pedraza MD

## 2020-04-27 NOTE — PATIENT INSTRUCTIONS

## 2020-09-29 ENCOUNTER — HOSPITAL ENCOUNTER (OUTPATIENT)
Dept: GENERAL RADIOLOGY | Age: 55
Discharge: HOME | End: 2020-09-29
Payer: COMMERCIAL

## 2020-09-29 ENCOUNTER — TELEPHONE (OUTPATIENT)
Dept: FAMILY MEDICINE CLINIC | Age: 55
End: 2020-09-29

## 2020-09-29 ENCOUNTER — OFFICE VISIT (OUTPATIENT)
Dept: FAMILY MEDICINE CLINIC | Age: 55
End: 2020-09-29
Payer: COMMERCIAL

## 2020-09-29 VITALS
DIASTOLIC BLOOD PRESSURE: 80 MMHG | BODY MASS INDEX: 33.39 KG/M2 | HEART RATE: 67 BPM | SYSTOLIC BLOOD PRESSURE: 116 MMHG | HEIGHT: 64 IN | TEMPERATURE: 98.6 F | RESPIRATION RATE: 16 BRPM | WEIGHT: 195.6 LBS | OXYGEN SATURATION: 99 %

## 2020-09-29 DIAGNOSIS — M25.562 ACUTE PAIN OF LEFT KNEE: Primary | ICD-10-CM

## 2020-09-29 DIAGNOSIS — E78.9 BORDERLINE HIGH CHOLESTEROL: ICD-10-CM

## 2020-09-29 DIAGNOSIS — M25.562 ACUTE PAIN OF LEFT KNEE: ICD-10-CM

## 2020-09-29 DIAGNOSIS — F33.0 DEPRESSION, MAJOR, RECURRENT, MILD (HCC): ICD-10-CM

## 2020-09-29 DIAGNOSIS — E55.9 VITAMIN D DEFICIENCY: ICD-10-CM

## 2020-09-29 PROCEDURE — 73564 X-RAY EXAM KNEE 4 OR MORE: CPT

## 2020-09-29 PROCEDURE — 99214 OFFICE O/P EST MOD 30 MIN: CPT | Performed by: FAMILY MEDICINE

## 2020-09-29 RX ORDER — METHYLPREDNISOLONE 4 MG/1
TABLET ORAL
Qty: 1 DOSE PACK | Refills: 0 | Status: SHIPPED | OUTPATIENT
Start: 2020-09-29 | End: 2022-03-03

## 2020-09-29 NOTE — PROGRESS NOTES
1. Have you been to the ER, urgent care clinic since your last visit? Hospitalized since your last visit? No    2. Have you seen or consulted any other health care providers outside of the [address] [place] [address] since your last visit? Include any pap smears or colon screening. No    Chief Complaint   Patient presents with    Knee Pain     left knee pain x 2 weeks     Patient declined flu vaccine.

## 2020-09-29 NOTE — PROGRESS NOTES
Rei Gonzalez, 53 y.o.,  female    SUBJECTIVE  L knee pain x 2 weeks    Anterior Left knee pain worse with weight bearing and twisting movements. No known injury, some swelling no redness. She reports formal PT 2 years ago resulted to improvement. Past 2 weeks taking tylenol/ibuprofen without much improvement. No h/o gout or particular type of food. Depression- doing well, busy with work in good spirits despite of pandemic. She continues to take zoloft    Borderline high cholesterol/vitamin D def- unable to get labs drawn pt is reminded    Upcoming CRCS with dr. Rita Hernandez. ROS:  See HPI, all others negative        Patient Active Problem List   Diagnosis Code    Migraine G43.909    Vitamin D deficiency E55.9    Prediabetes R73.03    Colon cancer screening Z12.11    History of hypertension Z86.79    Depression, major, recurrent, mild (HCC) F33.0       Current Outpatient Medications   Medication Sig Dispense Refill    methylPREDNISolone (Medrol, Charles,) 4 mg tablet Per dose pack instructions 1 Dose Pack 0    sertraline (ZOLOFT) 50 mg tablet Take 1 Tab by mouth daily. 90 Tab 0    cholecalciferol (VITAMIN D3) (2,000 UNITS /50 MCG) cap capsule Take 2,000 Units by mouth once over twenty-four (24) hours.  90 Cap 3       No Known Allergies    Past Medical History:   Diagnosis Date    Anxiety     Depression     medication briefly a few years ago    Hypertension     previously on medication, stopped it on own about a year ago    Migraine     prn excedrine migraine    Pap smear about 10 years ago    no abnormals    Prediabetes 5/2015    Screening mammogram last about 2007    Vitamin D deficiency        Social History     Socioeconomic History    Marital status:      Spouse name: Not on file    Number of children: Not on file    Years of education: Not on file    Highest education level: Not on file   Occupational History    Not on file   Social Needs    Financial resource strain: Not on file    Food insecurity     Worry: Not on file     Inability: Not on file    Transportation needs     Medical: Not on file     Non-medical: Not on file   Tobacco Use    Smoking status: Never Smoker    Smokeless tobacco: Never Used   Substance and Sexual Activity    Alcohol use: No    Drug use: No    Sexual activity: Not Currently     Partners: Male     Birth control/protection: None     Comment: not for past 2 years   Lifestyle    Physical activity     Days per week: Not on file     Minutes per session: Not on file    Stress: Not on file   Relationships    Social connections     Talks on phone: Not on file     Gets together: Not on file     Attends Oriental orthodox service: Not on file     Active member of club or organization: Not on file     Attends meetings of clubs or organizations: Not on file     Relationship status: Not on file    Intimate partner violence     Fear of current or ex partner: Not on file     Emotionally abused: Not on file     Physically abused: Not on file     Forced sexual activity: Not on file   Other Topics Concern    Not on file   Social History Narrative    Not on file       Family History   Problem Relation Age of Onset    Cancer Mother         non-hodgkins     Cancer Sister 28        breast    Breast Cancer Sister 39    Breast Cancer Maternal Grandmother          OBJECTIVE    Physical Exam:     Visit Vitals  /80 (BP 1 Location: Left arm, BP Patient Position: Sitting)   Pulse 67   Temp 98.6 °F (37 °C) (Oral)   Resp 16   Ht 5' 4\" (1.626 m)   Wt 195 lb 9.6 oz (88.7 kg)   SpO2 99%   BMI 33.57 kg/m²       General: alert, well-appearing,in no apparent distress or pain  Neck: supple, no adenopathy palpated  CVS: normal rate, regular rhythm, distinct S1 and S2  Lungs:clear to ausculation bilaterally, no crackles, wheezing or rhonchi noted  Abdomen: normoactive bowel sounds, soft, non-tender  Extremities: L knee +mild effusion, +MTTP anterior knee. FROM.    Skin: warm, no lesions, rashes noted  Psych:  mood and affect normal        ASSESSMENT/PLAN  Diagnoses and all orders for this visit:    1. Acute pain of left knee  -     XR KNEE LT MIN 4 V; Future  HEP for bursitis  Given medrol dose pack    2. Depression, major, recurrent, mild (HCC)  Stable  Cont zoloft    3. Vitamin D deficiency  H/o recheck  Reminded on standing lab order    4. Borderline high cholesterol  Cont lifestyle efforts  Reminded on repeat lipid check    5. BMI 33.0-33.9,adult    Other orders  -     methylPREDNISolone (Medrol, Charles,) 4 mg tablet; Per dose pack instructions        Follow-up and Dispositions    · Return in about 3 months (around 12/29/2020), or if symptoms worsen or fail to improve, for labs soon, routine chronic illness care. Patient understands plan of care. Patient has provided input and agrees with goals.

## 2020-09-29 NOTE — PATIENT INSTRUCTIONS
Knee: Exercises  Introduction  Here are some examples of exercises for you to try. The exercises may be suggested for a condition or for rehabilitation. Start each exercise slowly. Ease off the exercises if you start to have pain. You will be told when to start these exercises and which ones will work best for you. How to do the exercises  Quad sets   1. Sit with your leg straight and supported on the floor or a firm bed. (If you feel discomfort in the front or back of your knee, place a small towel roll under your knee.)  2. Tighten the muscles on top of your thigh by pressing the back of your knee flat down to the floor. (If you feel discomfort under your kneecap, place a small towel roll under your knee.)  3. Hold for about 6 seconds, then rest for up to 10 seconds. 4. Do 8 to 12 repetitions several times a day. Straight-leg raises to the front   1. Lie on your back with your good knee bent so that your foot rests flat on the floor. Your injured leg should be straight. Make sure that your low back has a normal curve. You should be able to slip your flat hand in between the floor and the small of your back, with your palm touching the floor and your back touching the back of your hand. 2. Tighten the thigh muscles in the injured leg by pressing the back of your knee flat down to the floor. Hold your knee straight. 3. Keeping the thigh muscles tight, lift your injured leg up so that your heel is about 12 inches off the floor. Hold for about 6 seconds and then lower slowly. 4. Do 8 to 12 repetitions, 3 times a day. Straight-leg raises to the outside   1. Lie on your side, with your injured leg on top. 2. Tighten the front thigh muscles of your injured leg to keep your knee straight. 3. Keep your hip and your leg straight in line with the rest of your body, and keep your knee pointing forward. Do not drop your hip back.   4. Lift your injured leg straight up toward the ceiling, about 12 inches off the floor. Hold for about 6 seconds, then slowly lower your leg. 5. Do 8 to 12 repetitions. Straight-leg raises to the back   1. Lie on your stomach, and lift your leg straight up behind you (toward the ceiling). 2. Lift your toes about 6 inches off the floor, hold for about 6 seconds, then lower slowly. 3. Do 8 to 12 repetitions. Straight-leg raises to the inside   1. Lie on the side of your body with the injured leg. 2. You can either prop your other (good) leg up on a chair, or you can bend your good knee and put that foot in front of your injured knee. Do not drop your hip back. 3. Tighten the muscles on the front of your thigh to straighten your injured knee. 4. Keep your kneecap pointing forward, and lift your whole leg up toward the ceiling about 6 inches. Hold for about 6 seconds, then lower slowly. 5. Do 8 to 12 repetitions. Heel dig bridging   1. Lie on your back with both knees bent and your ankles bent so that only your heels are digging into the floor. Your knees should be bent about 90 degrees. 2. Then push your heels into the floor, squeeze your buttocks, and lift your hips off the floor until your shoulders, hips, and knees are all in a straight line. 3. Hold for about 6 seconds as you continue to breathe normally, and then slowly lower your hips back down to the floor and rest for up to 10 seconds. 4. Do 8 to 12 repetitions. Hamstring curls   1. Lie on your stomach with your knees straight. If your kneecap is uncomfortable, roll up a washcloth and put it under your leg just above your kneecap. 2. Lift the foot of your injured leg by bending the knee so that you bring the foot up toward your buttock. If this motion hurts, try it without bending your knee quite as far. This may help you avoid any painful motion. 3. Slowly lower your leg back to the floor. 4. Do 8 to 12 repetitions.   5. With permission from your doctor or physical therapist, you may also want to add a cuff weight to your ankle (not more than 5 pounds). With weight, you do not have to lift your leg more than 12 inches to get a hamstring workout. Shallow standing knee bends   Do this exercise only if you have very little pain; if you have no clicking, locking, or giving way if you have an injured knee; and if it does not hurt while you are doing 8 to 12 repetitions. 1. Stand with your hands lightly resting on a counter or chair in front of you. Put your feet shoulder-width apart. 2. Slowly bend your knees so that you squat down like you are going to sit in a chair. Make sure your knees do not go in front of your toes. 3. Lower yourself about 6 inches. Your heels should remain on the floor at all times. 4. Rise slowly to a standing position. Heel raises   1. Stand with your feet a few inches apart, with your hands lightly resting on a counter or chair in front of you. 2. Slowly raise your heels off the floor while keeping your knees straight. 3. Hold for about 6 seconds, then slowly lower your heels to the floor. 4. Do 8 to 12 repetitions several times during the day. Follow-up care is a key part of your treatment and safety. Be sure to make and go to all appointments, and call your doctor if you are having problems. It's also a good idea to know your test results and keep a list of the medicines you take. Where can you learn more? Go to http://beata-ryan.info/  Enter Z119 in the search box to learn more about \"Knee: Exercises. \"  Current as of: March 2, 2020               Content Version: 12.6  © 3449-0242 [employer]. Care instructions adapted under license by [employer] (which disclaims liability or warranty for this information). If you have questions about a medical condition or this instruction, always ask your healthcare professional. [address] any warranty or liability for your use of this information.

## 2020-09-29 NOTE — TELEPHONE ENCOUNTER
Have you been diagnosed with, tested for, or told that you are suspected of having COVID-19 (coronovirus)? No  Have you had a fever or taken medication to treat a fever in the past 72 hours? No  Have you had a cough, SOB, or flu-like symptoms within the past 3 days? No   Have you had direct contact with someone who tested positive for COVID-19 within the past 14 days? No  Do you have a household member with flu-like symptoms, including fever, cough, or SOB? No   Do you currently have flu-like symptoms including fever, cough, or SOB? No   Are you experiencing new loss of taste or smell?  No

## 2020-10-01 ENCOUNTER — TELEPHONE (OUTPATIENT)
Dept: FAMILY MEDICINE CLINIC | Age: 55
End: 2020-10-01

## 2020-10-01 DIAGNOSIS — M25.562 ACUTE PAIN OF LEFT KNEE: Primary | ICD-10-CM

## 2020-10-01 NOTE — TELEPHONE ENCOUNTER
Patient aware of referral.   Patient has name and address with phone # to call and schedule he own appt.

## 2020-10-01 NOTE — TELEPHONE ENCOUNTER
Patient identified with 2 identifiers (name and ). Spoke with patient and she is aware of x ray results. Patient states that yesterday afternoon she pulled on a cart and felt something pop in the back of her knee. Patient states she is in a lot of pain and is having difficulty walking now. Patient would like the referral placed to ortho.    Patient is taking the medrol dose kenji

## 2020-10-01 NOTE — TELEPHONE ENCOUNTER
Xray showing no acute findings, no fracture or significant osteoarthritis finding. To complete medrol dose pack, and monitor, if not better, will refer to ortho.

## 2020-10-02 ENCOUNTER — OFFICE VISIT (OUTPATIENT)
Dept: ORTHOPEDIC SURGERY | Age: 55
End: 2020-10-02
Payer: COMMERCIAL

## 2020-10-02 VITALS
OXYGEN SATURATION: 96 % | DIASTOLIC BLOOD PRESSURE: 88 MMHG | TEMPERATURE: 97.4 F | HEIGHT: 64 IN | BODY MASS INDEX: 33.16 KG/M2 | SYSTOLIC BLOOD PRESSURE: 136 MMHG | RESPIRATION RATE: 16 BRPM | WEIGHT: 194.2 LBS | HEART RATE: 70 BPM

## 2020-10-02 DIAGNOSIS — M54.16 LUMBAR RADICULOPATHY: ICD-10-CM

## 2020-10-02 DIAGNOSIS — M25.562 CHRONIC PAIN OF LEFT KNEE: ICD-10-CM

## 2020-10-02 DIAGNOSIS — M79.652 PAIN OF LEFT THIGH: ICD-10-CM

## 2020-10-02 DIAGNOSIS — G89.29 CHRONIC PAIN OF LEFT KNEE: ICD-10-CM

## 2020-10-02 DIAGNOSIS — M25.462 EFFUSION OF LEFT KNEE: ICD-10-CM

## 2020-10-02 DIAGNOSIS — M17.12 PRIMARY OSTEOARTHRITIS OF LEFT KNEE: Primary | ICD-10-CM

## 2020-10-02 PROCEDURE — 20610 DRAIN/INJ JOINT/BURSA W/O US: CPT | Performed by: SPECIALIST

## 2020-10-02 PROCEDURE — 99213 OFFICE O/P EST LOW 20 MIN: CPT | Performed by: SPECIALIST

## 2020-10-02 RX ORDER — BETAMETHASONE SODIUM PHOSPHATE AND BETAMETHASONE ACETATE 3; 3 MG/ML; MG/ML
6 INJECTION, SUSPENSION INTRA-ARTICULAR; INTRALESIONAL; INTRAMUSCULAR; SOFT TISSUE ONCE
Qty: 0.5 ML | Refills: 0
Start: 2020-10-02 | End: 2020-10-02

## 2020-10-02 NOTE — PROGRESS NOTES
Patient: Dayna Wells                MRN: [id number]       SSN: [id number]  YOB: 1965        AGE: 53 y.o. SEX: female    PCP: Valeriano Mcfarland MD  10/02/20    CC: LEFT LEG AND LEFT KNEE PAIN AND EFFUSION    HISTORY:  Dayna Wells is a 53 y.o. female who is seen for left knee pain and swelling. She started feeling left leg pain 9/28/20. She does not recall any injury. She was seen by her primary care doctor who prescribed a steroid taper pack. She has completed the first 2 days of the medrol kenji without much relief. She feels lateral left thigh, left shin pain, and medial left knee pain. She denies any back pain. In 2018 she experienced a similar pain scenario in her right leg for which Dr. Cynthia Gao ordered physical therapy. 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 She lives in [place] with her  and 24 yo daughter. She has an older daughter, a grandson, and a granddaughter. She has a rescue mix breed walker hound dog named Michel Linda, a cat, and 4 backyard chickens. She gained 20 pounds during the Coronavirus shutdown. Ms. Natan Hi weighs 194 lbs and is 5'4\" tall.        Lab Results   Component Value Date/Time    Hemoglobin A1c 5.5 04/14/2020 10:30 AM     Weight Metrics 10/2/2020 9/29/2020 3/19/2020 1/25/2018 1/22/2018 1/16/2018 6/20/2017   Weight 194 lb 3.2 oz 195 lb 9.6 oz 192 lb 192 lb 3.2 oz 190 lb 190 lb 187 lb   BMI 33.33 kg/m2 33.57 kg/m2 32.96 kg/m2 32.99 kg/m2 32.61 kg/m2 32.61 kg/m2 32.1 kg/m2       Patient Active Problem List   Diagnosis Code    Migraine G43.909    Vitamin D deficiency E55.9    Prediabetes R73.03    Colon cancer screening Z12.11    History of hypertension Z86.79    Depression, major, recurrent, mild (HCC) F33.0     REVIEW OF SYSTEMS:    Constitutional Symptoms: Negative   Eyes: Negative   Ears, Nose, Throat and Mouth: Negative   Cardiovascular: Negative   Respiratory: Negative   Genitourinary: Per HPI   Gastrointestinal: Per HPI   Integumentary (Skin and/or Breast): Negative   Musculoskeletal: Per HPI   Endocrine/Rheumatologic: Negative   Neurological: Per HPI   Hematology/Lymphatic: Negative    Allergic/Immunologic: Negative   Phychiatric: Negative    Social History     Socioeconomic History    Marital status:      Spouse name: Not on file    Number of children: Not on file    Years of education: Not on file    Highest education level: Not on file   Occupational History    Not on file   Social Needs    Financial resource strain: Not on file    Food insecurity     Worry: Not on file     Inability: Not on file    Transportation needs     Medical: Not on file     Non-medical: Not on file   Tobacco Use    Smoking status: Never Smoker    Smokeless tobacco: Never Used   Substance and Sexual Activity    Alcohol use: No    Drug use: No    Sexual activity: Not Currently     Partners: Male     Birth control/protection: None     Comment: not for past 2 years   Lifestyle    Physical activity     Days per week: Not on file     Minutes per session: Not on file    Stress: Not on file   Relationships    Social connections     Talks on phone: Not on file     Gets together: Not on file     Attends Yazdanism service: Not on file     Active member of club or organization: Not on file     Attends meetings of clubs or organizations: Not on file     Relationship status: Not on file    Intimate partner violence     Fear of current or ex partner: Not on file     Emotionally abused: Not on file     Physically abused: Not on file Forced sexual activity: Not on file   Other Topics Concern    Not on file   Social History Narrative    Not on file      No Known Allergies   Current Outpatient Medications   Medication Sig    methylPREDNISolone (Medrol, Charles,) 4 mg tablet Per dose pack instructions    cholecalciferol (VITAMIN D3) (2,000 UNITS /50 MCG) cap capsule Take 2,000 Units by mouth once over twenty-four (24) hours.  sertraline (ZOLOFT) 50 mg tablet Take 1 Tab by mouth daily. No current facility-administered medications for this visit. PHYSICAL EXAMINATION:  Visit Vitals  /88 (BP 1 Location: Left arm)   Pulse 70   Temp 97.4 °F (36.3 °C) (Temporal)   Resp 16   Ht 5' 4\" (1.626 m)   Wt 194 lb 3.2 oz (88.1 kg)   SpO2 96%   BMI 33.33 kg/m²      ORTHO EXAMINATION:  Examination Right knee Left knee   Skin Intact Intact   Range of motion 120-0 100-0   Effusion - +++   Medial joint line tenderness + ++   Lateral joint line tenderness - -   Popliteal tenderness - -   Osteophytes palpable - -   Waynes - -   Patella crepitus + +   Anterior drawer - -   Lateral laxity - -   Medial laxity - -   Varus deformity - -   Valgus deformity - -   Pretibial edema - -   Calf tenderness - -     TIME OUT:  Chart reviewed for the following:   I, Ute Barnes MD, have reviewed the History, Physical and updated the Allergic reactions for [address] performed immediately prior to start of procedure:  Yanet Combs MD, have performed the following reviews on Jessica Campuzano prior to the start of the procedure:          * Patient was identified by name and date of birth   * Agreement on procedure being performed was verified  * Risks and Benefits explained to the patient  * Procedure site verified and marked as necessary  * Patient was positioned for comfort  * Consent was obtained     Time: 1:25 PM     Date of procedure: 10/2/2020  Procedure performed by:  Ute Barnes MD  Ms. Jones tolerated the procedure well with no complications. RADIOGRAPHS:  XR LEFT KNEE 9/29/20 [facility]  IMPRESSION:  No acute fracture or significant degenerative changes of the left knee. -I have independently reviewed these images during this office visit. -Dr. Roz Heaton:  Three views - No fractures, no effusion, moderate medial joint space narrowing, no osteophytes present. Kellgren Kelvin grade 2     IMPRESSION:      ICD-10-CM ICD-9-CM    1. Primary osteoarthritis of left knee  M17.12 715.16 REFERRAL TO PHYSICAL THERAPY   2. Pain of left thigh  M79.652 729.5 REFERRAL TO PHYSICAL THERAPY   3. Chronic pain of left knee  M25.562 719.46     G89.29 338.29    4. Effusion of left knee  M25.462 719.06    5. Lumbar radiculopathy  M54.16 724.4      PLAN: Work note provided--return to work 10/12/20. She will start a brief course of outpatient physical therapy. After timeout and under sterile conditions, left knee aspirated 30 cc of light yellow blood tinged fluid. The fluid was discarded. After discussing treatment options, patient's left knee was injected with 4 cc Marcaine and 1/2 cc Celestone. There is no need for surgery at this time. She will follow up as needed.       Scribed by Esther Brown ([address]) as dictated by Sandor Childs MD

## 2020-10-02 NOTE — LETTER
NOTIFICATION RETURN TO WORK  
 
10/2/2020 1:33 PM 
 
Ms. Clayton Serrano [address] [place] [address] [phone] To Whom It May Concern: 
 
Clayton Serrano is currently under the care of [address] [place] [address]. She will return to work on: Monday 10---no restrictions. If there are questions or concerns please have the patient contact our office.  
 
 
 
Sincerely, 
 
 
 
Alexis Pringle MD

## 2020-10-08 ENCOUNTER — HOSPITAL ENCOUNTER (OUTPATIENT)
Dept: PHYSICAL THERAPY | Age: 55
Discharge: HOME | End: 2020-10-08
Payer: COMMERCIAL

## 2020-10-08 PROCEDURE — 97110 THERAPEUTIC EXERCISES: CPT

## 2020-10-08 PROCEDURE — 97161 PT EVAL LOW COMPLEX 20 MIN: CPT

## 2020-10-08 NOTE — PROGRESS NOTES
PHYSICAL THERAPY - DAILY TREATMENT NOTE    Patient Name: Radha Sandoval        Date: 10/8/2020  : 1965   yes Patient  Verified  Visit #:   1   of   10  Insurance: Payor: [employer] / Plan: [address] [place] [address] / Product Type: PPO /      In time: 8:35 Out time: 9:29   Total Treatment Time: 54     Medicare/BCBS Time Tracking (below)   Total Timed Codes (min):  10 1:1 Treatment Time:  54     TREATMENT AREA =  Pain in left knee [M25.562]    SUBJECTIVE  Pain Level (on 0 to 10 scale):  1  / 10   Medication Changes/New allergies or changes in medical history, any new surgeries or procedures?    no  If yes, update Summary List   Subjective Functional Status/Changes:  []  No changes reported     See POC          OBJECTIVE    10 min Therapeutic Exercise:  [x]  See flow sheet   Rationale:      increase ROM and increase strength to improve the patients ability to ambulate, transfer       Billed With/As:   [x] TE   [] TA   [] Neuro   [] Self Care Patient Education: [x] Review HEP    [] Progressed/Changed HEP based on:   [] positioning   [] body mechanics   [] transfers   [] heat/ice application    [] other:      Other Objective/Functional Measures:    See POC     Post Treatment Pain Level (on 0 to 10) scale:   1  / 10     ASSESSMENT  Assessment/Changes in Function:     See POC     []  See Progress Note/Recertification   Patient will continue to benefit from skilled PT services to modify and progress therapeutic interventions, address functional mobility deficits, address ROM deficits, address strength deficits, analyze and address soft tissue restrictions, analyze and cue movement patterns, analyze and modify body mechanics/ergonomics, assess and modify postural abnormalities, address imbalance/dizziness and instruct in home and community integration to attain remaining goals.    Progress toward goals / Updated goals:    See POC     PLAN  []  Upgrade activities as tolerated yes Continue plan of care   [] Discharge due to :    []  Other:      Therapist: Marilin Watkins PT    Date: 10/8/2020 Time: 8:31 AM     Future Appointments   Date Time Provider Katarzyna Garcia   10/23/2020  2:45 PM [facility] RM 3 3D [facility]   12/1/2020 10:00 AM [facility] NURSE VISIT [facility] AMB   12/29/2020  9:45 AM Nevaeh Dykes MD [facility] BS AMB

## 2020-10-08 NOTE — PROGRESS NOTES
[address] [facility] AT [address], [address] [place] [address] Road  Phone: [phone]   [phone]  PLAN OF CARE / [address] [place] [address] PHYSICAL THERAPY SERVICES  Patient Name: Hernandez Flores : 1965   Medical   Diagnosis: L knee OA [M17.12] Treatment Diagnosis: Pain in left knee [M25.562]   Onset Date: ~3 weeks prior to IE     Referral Source: Jennifer Quiroz MD Start of [facility]): 10/8/2020   Prior Hospitalization: See medical history Provider #: [id number]   Prior Level of Function: Previously able to bend, squat, lift w/o difficulty   Comorbidities: None reported   Medications: Verified on Patient Summary List   The Plan of Care and following information is based on the information from the initial evaluation.   ===========================================================================================  Assessment / key information: Pt is a 52 y/o F who presents to PT w/ c/o insidious onset L knee pain, that has been present for approximately 3 weeks. Pt notes pain ranges 0 to 9/10, made worse with bending, descending stairs, squatting, rising to stand from low surface; better with rest. Describes her pain as tightness, located popliteal fossa. Pt also c/o L anterior shin pain, which is worse with walking. Pt notes some fear of instability when walking and descending stairs. Testing/imaging has included x-rays, which was (+) for OA. Prior treatment has included needle aspiration 1 week ago, as well as cortisone injection. Pt also completed prednisone taper, which she completed two days ago. Red flags negative     Clinical Exam Findings:  POSTURE/OBSERVATION: multiple spider veins present L medial knee and posterior thigh. Slight effusion L popliteal fossa. GAIT: Antalgic gait, flexed knee in terminal stance on the L, dec heel strike, and dec knee flexion on swing.  Ascends/descends 4 steps w/ B HR, reciprocal pattern with dec WB on the L and slight lateral body rotation when descending on the R.  ROM: L knee AROM 5p!-0-130 \"tight) (R 1-0-135). gastroc L 5 deg (R 5 deg)  STRENGTH: knee flexion L 4-/5 (R 4+/5), knee ext L 4/5, (R 5/5), ankle DF L 4+/5 p! (R 5/5). Hip flex L 4-/5 p! (R 4/5), abd L 4/5 (R 4/5), add L 3+/5 (R 4-/5), ext 4-/5 B. PALPATION: TTP to medial joint line, distal semi-membranosus, patellar tendon, and ant tib. SPECIAL TEST: (+) 90/90 L (-30, R -20). (+) Wayne w/ medial bias. (+) Thomson's sign. (+) ely test (pain ant knee), (+) neil test L. (-) varus/valgus, ant drawer. SLS on the L 5\" w/ marked hip strategy (R WNL). Unable SL HR >2 reps onn the L 2' inc knee pain. FOTO 43/100. Pt presents w/ sx suggesting/consistent with L knee PFPS, possible MMT .  Pt could benefit from PT to address impairments and functional limitations in order to improve pt's ability to complete ADLs.  ===========================================================================================  Eval Complexity: History LOW Complexity : Zero comorbidities / personal factors that will impact the outcome / POC;  Examination  MEDIUM Complexity : 3 Standardized tests and measures addressing body structure, function, activity limitation and / or participation in recreation ; Presentation LOW Complexity : Stable, uncomplicated ;  Decision Making MEDIUM Complexity : FOTO score of 26-74; Overall Complexity LOW   Problem List: pain affecting function, decrease ROM, decrease strength, edema affecting function, impaired gait/ balance, decrease ADL/ functional abilitiies, decrease activity tolerance, decrease flexibility/ joint mobility and decrease transfer abilities   Treatment Plan may include any combination of the following: Therapeutic exercise, Therapeutic activities, Neuromuscular re-education, Physical agent/modality, Gait/balance training, Manual therapy, Patient education, Self Care training, Functional mobility training, Home safety training and Stair training  Patient / Family readiness to learn indicated by: asking questions, trying to perform skills and interest  Persons(s) to be included in education: patient (P)  Barriers to Learning/Limitations: None  Measures taken, if barriers to learning:    Patient Goal (s): \"no more pain and be able to bend my knee\"   Patient self reported health status: good  Rehabilitation Potential: good   Short Term Goals: To be accomplished in  2  weeks:  1. Pt will be I and compliant with HEP for self management of sx. 2. Pt will ambulate w/ symmetrical gait to enable pt ability to return to walking for exercise    Long Term Goals: To be accomplished in  5  weeks:  1. Pt will perform 10x lateral tap down from 6\" step, pain-free w/ proper form to improve ability to descend stairs  2. Pt will maintain SLS on the L x 20\" on dynamic surface w/o LOB to improve knee stability for ambulating on unlevel surfaces  3. Improve FOTO score to >/= 67/100 to indicate improved function    Frequency / Duration:   Patient to be seen  2  times per week for 5  weeks:  Patient / Caregiver education and instruction: exercises  Therapist Signature: Kelsey Troy PT Date: 40/1/0107   Certification Period: na Time: 8:32 AM   ===========================================================================================  I certify that the above Physical Therapy Services are being furnished while the patient is under my care. I agree with the treatment plan and certify that this therapy is necessary. Physician Signature:        Date:       Time:     Please sign and return to [facility] UNIT or you may fax the signed copy to [phone]. Thank you.

## 2020-10-22 ENCOUNTER — HOSPITAL ENCOUNTER (OUTPATIENT)
Dept: PHYSICAL THERAPY | Age: 55
Discharge: HOME | End: 2020-10-22
Payer: COMMERCIAL

## 2020-10-22 PROCEDURE — 97110 THERAPEUTIC EXERCISES: CPT

## 2020-10-22 PROCEDURE — 97140 MANUAL THERAPY 1/> REGIONS: CPT

## 2020-10-22 NOTE — PROGRESS NOTES
PT DAILY TREATMENT NOTE     Patient Name: Radha Dealo  Date:10/22/2020  : 1965  [x]  Patient  Verified  Payor: KYLE SABRINA / Plan: [address] [place] [address] / Product Type: PPO /    In time:7:00  Out time:7:53  Total Treatment Time (min): 53  Total Timed Codes (min): 53  1:1 Treatment Time (min): 47   Visit #: 2 of 10    Treatment Area: Pain in left knee [M25.562]    SUBJECTIVE  Pain Level (0-10 scale): 6/10  Any medication changes, allergies to medications, adverse drug reactions, diagnosis change, or new procedure performed?: [x] No    [] Yes (see summary sheet for update)  Subjective functional status/changes:   [] No changes reported  I feel most of the pain in the back and side of my knee especially when I do anything involving squatting or going up the stairs. OBJECTIVE      43 min Therapeutic Exercise:  [x] See flow sheet :   Rationale: increase ROM, increase strength, improve balance and increase proprioception to improve the patients ability to improve functional abilites    10 min Manual Therapy:  Manual prone quad stretching and Instrument assisted soft tissue mobilization applied to popliteal fossa/distal hamstring insertion focusing on medial side using GT-3&4   Rationale: decrease pain, increase ROM, increase tissue extensibility and decrease trigger points to improve functional mobility         min Patient Education: [x] Review HEP    [] Progressed/Changed HEP based on:   [] positioning   [] body mechanics   [] transfers   [] heat/ice application        Other Objective/Functional Measures:     Pain Level (0-10 scale) post treatment: 3-4/10    ASSESSMENT/Changes in Function: Pt tolerated all new therex fairly well upon trial today with chief c/o medial distal hamstring insertion region pain/symptoms with increased squatting ADL's as well as ascending stairs. Pt also presented with increased tissue tension at distal medial hamstring insertion with manual intervention today. Pt needs the most focus on knee mobility and quadriceps/hamstring strengthening. Will continue to progress/advance patient within current POC as tolerated with monitoring symptoms. Patient will continue to benefit from skilled PT services to modify and progress therapeutic interventions, address functional mobility deficits, address ROM deficits, address strength deficits, analyze and address soft tissue restrictions, analyze and cue movement patterns and instruct in home and community integration to attain remaining goals. []  See Plan of Care  []  See progress note/recertification  []  See Discharge Summary         Progress towards goals / Updated goals: · Short Term Goals: To be accomplished in  2  weeks:  1. Pt will be I and compliant with HEP for self management of sx.10/22/20: Not Met, Pt admits limited,inconsistent compliance with HEP, which importance of was re enforced today  2. Pt will ambulate w/ symmetrical gait to enable pt ability to return to walking for exercise   · Long Term Goals: To be accomplished in  5  weeks:  1. Pt will perform 10x lateral tap down from 6\" step, pain-free w/ proper form to improve ability to descend stairs  2. Pt will maintain SLS on the L x 20\" on dynamic surface w/o LOB to improve knee stability for ambulating on unlevel surfaces  3.  Improve FOTO score to >/= 67/100 to indicate improved function    PLAN  [x]  Upgrade activities as tolerated     []  Continue plan of care  []  Update interventions per flow sheet       []  Discharge due to:_  []  Other:_      Jeremie Ruiz PTA 10/22/2020  7:02 AM      Future Appointments   Date Time Provider Katarzyna Garcia   10/23/2020  2:45 PM [facility] RM 3 3D [facility]   10/28/2020  6:00 PM Joycstephenie Half, PTA [facility]   10/30/2020  7:00 AM Joycstephenie Half, PTA [facility]   12/1/2020 10:00 AM [facility] NURSE VISIT [facility]   12/29/2020  9:45 AM Franck Dykes MD [facility] AMB

## 2020-10-23 ENCOUNTER — HOSPITAL ENCOUNTER (OUTPATIENT)
Dept: MAMMOGRAPHY | Age: 55
Discharge: HOME | End: 2020-10-23
Attending: FAMILY MEDICINE
Payer: COMMERCIAL

## 2020-10-23 DIAGNOSIS — Z12.31 VISIT FOR SCREENING MAMMOGRAM: ICD-10-CM

## 2020-10-23 PROCEDURE — 77067 SCR MAMMO BI INCL CAD: CPT

## 2020-10-28 ENCOUNTER — APPOINTMENT (OUTPATIENT)
Dept: PHYSICAL THERAPY | Age: 55
End: 2020-10-28
Payer: COMMERCIAL

## 2020-10-30 ENCOUNTER — HOSPITAL ENCOUNTER (OUTPATIENT)
Dept: PHYSICAL THERAPY | Age: 55
Discharge: HOME | End: 2020-10-30
Payer: COMMERCIAL

## 2020-10-30 PROCEDURE — 97110 THERAPEUTIC EXERCISES: CPT

## 2020-10-30 PROCEDURE — 97140 MANUAL THERAPY 1/> REGIONS: CPT

## 2020-10-30 NOTE — PROGRESS NOTES
PT DAILY TREATMENT NOTE     Patient Name: Kevyn Thomas  Date:10/30/2020  : 1965  [x]  Patient  Verified  Payor: BLUE CROSS / Plan: [address] [place] [address] Avenue / Product Type: PPO /    In time:7:04  Out time:8:00  Total Treatment Time (min): 56  Total Timed Codes (min): 56  1:1 Treatment Time (min): 56   Visit #: 3 of 10    Treatment Area: Pain in left knee [M25.562]    SUBJECTIVE  Pain Level (0-10 scale): 0  Any medication changes, allergies to medications, adverse drug reactions, diagnosis change, or new procedure performed?: [x] No    [] Yes (see summary sheet for update)  Subjective functional status/changes:   [] No changes reported  My knee felt pretty good for about an hour after I left the last time I was here, but then 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 OBJECTIVE      44 min Therapeutic Exercise:  [x] See flow sheet :   Rationale: increase ROM, increase strength, improve balance and increase proprioception to improve the patients ability to improve functional abilities     12 min Manual Therapy:  Prone quadriceps stretching and Instrument assisted soft tissue mobilization applied to distal quadriceps and medial knee using GT-1&3   Rationale: decrease pain, increase ROM, increase tissue extensibility and decrease trigger points to improve mobility         min Patient Education: [x] Review HEP    [] Progressed/Changed HEP based on:   [] positioning   [] body mechanics   [] transfers   [] heat/ice application        Other Objective/Functional Measures:     Pain Level (0-10 scale) post treatment: 0    ASSESSMENT/Changes in Function: Pt presented with increased short term relief for approximately 1 hour after last treatment until symptoms increased. Pt also presented with chief c/o distal quadriceps and medial knee pain/tension which was addressed with manual intervention with good relief.  Pt was however able to tolerate full normal therex regiment including addition of lateral step tap downs and bridging with ball adduction with min to mod challenge today. Will continue to progress/advance patient within current POC as tolerated with monitoring symptoms. Patient will continue to benefit from skilled PT services to modify and progress therapeutic interventions, address functional mobility deficits, address ROM deficits, address strength deficits, analyze and address soft tissue restrictions, analyze and cue movement patterns and instruct in home and community integration to attain remaining goals. []  See Plan of Care  []  See progress note/recertification  []  See Discharge Summary         Progress towards goals / Updated goals: · Short Term Goals: To be accomplished in  2  weeks:  1. Pt will be I and compliant with HEP for self management of sx.10/22/20: Not Met, Pt admits limited,inconsistent compliance with HEP, which importance of was re enforced today  2. Pt will ambulate w/ symmetrical gait to enable pt ability to return to walking for exercise 10/30/20: progressing, Pt still demonstrates mild L quad avoidance gait with ambulation   · Long Term Goals: To be accomplished in  5  weeks:  1. Pt will perform 10x lateral tap down from 6\" step, pain-free w/ proper form to improve ability to descend stairs  2. Pt will maintain SLS on the L x 20\" on dynamic surface w/o LOB to improve knee stability for ambulating on unlevel surfaces  3.  Improve FOTO score to >/= 67/100 to indicate improved function    PLAN  [x]  Upgrade activities as tolerated     []  Continue plan of care  []  Update interventions per flow sheet       []  Discharge due to:_  []  Other:_      Alhaji Quiles, PTA 10/30/2020  7:09 AM      Future Appointments   Date Time Provider Katarzyna Garcia   12/1/2020 10:00 AM [facility] NURSE VISIT JESSIE PICKERING   12/29/2020  9:45 AM MD BR[facility] BS AMB

## 2020-12-02 NOTE — PROGRESS NOTES
[facility] AT [address], [address] [place] [address]  Phone: [phone]   [phone]    DISCHARGE NOTE  Patient Name: Omaira Joyce : 1965   Treatment/Medical Diagnosis: Pain in left knee [M25.562]   Referral Source: Daniel Blank MD     Date of Initial Visit: 10/8/20 Attended Visits: 3 Missed Visits: 1       SUMMARY OF TREATMENT  Pt attended initial evaluation and 2 follow-up appointments. Unfortunately, patient failed to return for further treatment and is therefore discharged from our care at this time. A formal reassessment of goals was unable to be performed due to unplanned DC. RECOMMENDATIONS  Discontinue physical therapy due to patient not returning. If you have any questions/comments please contact us directly at [phone]. Thank you for allowing us to assist in the care of your patient.     Therapist Signature: Kobi Martínez PTA Date: 20    Brenna Russell PT, DPT, CMTPT Time: 10:41 AM

## 2020-12-09 ENCOUNTER — HOSPITAL ENCOUNTER (OUTPATIENT)
Dept: LAB | Age: 55
Discharge: HOME | End: 2020-12-09
Payer: COMMERCIAL

## 2020-12-09 DIAGNOSIS — E78.9 BORDERLINE HIGH CHOLESTEROL: ICD-10-CM

## 2020-12-09 DIAGNOSIS — E55.9 VITAMIN D DEFICIENCY: ICD-10-CM

## 2020-12-09 LAB
25(OH)D3 SERPL-MCNC: 29.5 NG/ML (ref 30–100)
CHOLEST SERPL-MCNC: 191 MG/DL
HDLC SERPL-MCNC: 54 MG/DL (ref 40–60)
HDLC SERPL: 3.5 {RATIO} (ref 0–5)
LDLC SERPL CALC-MCNC: 124.4 MG/DL (ref 0–100)
LIPID PROFILE,FLP: ABNORMAL
TRIGL SERPL-MCNC: 63 MG/DL (ref ?–150)
VLDLC SERPL CALC-MCNC: 12.6 MG/DL

## 2020-12-09 PROCEDURE — 80061 LIPID PANEL: CPT

## 2020-12-09 PROCEDURE — 36415 COLL VENOUS BLD VENIPUNCTURE: CPT

## 2020-12-09 PROCEDURE — 82306 VITAMIN D 25 HYDROXY: CPT

## 2021-12-23 ENCOUNTER — TRANSCRIBE ORDER (OUTPATIENT)
Dept: SCHEDULING | Age: 56
End: 2021-12-23

## 2021-12-23 DIAGNOSIS — Z12.31 VISIT FOR SCREENING MAMMOGRAM: Primary | ICD-10-CM

## 2021-12-30 ENCOUNTER — HOSPITAL ENCOUNTER (OUTPATIENT)
Dept: MAMMOGRAPHY | Age: 56
Discharge: HOME | End: 2021-12-30
Attending: FAMILY MEDICINE
Payer: COMMERCIAL

## 2021-12-30 DIAGNOSIS — Z12.31 VISIT FOR SCREENING MAMMOGRAM: ICD-10-CM

## 2021-12-30 PROCEDURE — 77063 BREAST TOMOSYNTHESIS BI: CPT

## 2022-01-27 ENCOUNTER — OFFICE VISIT (OUTPATIENT)
Dept: FAMILY MEDICINE CLINIC | Age: 57
End: 2022-01-27
Payer: COMMERCIAL

## 2022-01-27 ENCOUNTER — HOSPITAL ENCOUNTER (OUTPATIENT)
Dept: GENERAL RADIOLOGY | Age: 57
Discharge: HOME | End: 2022-01-27
Payer: COMMERCIAL

## 2022-01-27 VITALS
BODY MASS INDEX: 33.13 KG/M2 | TEMPERATURE: 97.9 F | WEIGHT: 193 LBS | OXYGEN SATURATION: 97 % | DIASTOLIC BLOOD PRESSURE: 84 MMHG | SYSTOLIC BLOOD PRESSURE: 136 MMHG | HEART RATE: 68 BPM | RESPIRATION RATE: 16 BRPM

## 2022-01-27 DIAGNOSIS — M79.641 BILATERAL HAND PAIN: ICD-10-CM

## 2022-01-27 DIAGNOSIS — Z13.220 LIPID SCREENING: ICD-10-CM

## 2022-01-27 DIAGNOSIS — M79.642 BILATERAL HAND PAIN: ICD-10-CM

## 2022-01-27 DIAGNOSIS — G44.219 EPISODIC TENSION-TYPE HEADACHE, NOT INTRACTABLE: ICD-10-CM

## 2022-01-27 DIAGNOSIS — E55.9 VITAMIN D DEFICIENCY: ICD-10-CM

## 2022-01-27 DIAGNOSIS — F33.0 DEPRESSION, MAJOR, RECURRENT, MILD (HCC): Primary | ICD-10-CM

## 2022-01-27 DIAGNOSIS — R73.03 PREDIABETES: ICD-10-CM

## 2022-01-27 PROCEDURE — 73130 X-RAY EXAM OF HAND: CPT

## 2022-01-27 PROCEDURE — 99214 OFFICE O/P EST MOD 30 MIN: CPT | Performed by: FAMILY MEDICINE

## 2022-01-27 RX ORDER — DULOXETIN HYDROCHLORIDE 30 MG/1
30 CAPSULE, DELAYED RELEASE ORAL DAILY
Qty: 90 CAPSULE | Refills: 0 | Status: SHIPPED | OUTPATIENT
Start: 2022-01-27

## 2022-01-27 NOTE — PROGRESS NOTES
Helder Arevalo, 61 y.o.,  female    SUBJECTIVE  Ff-up    Have not seen pt in 2 years    Depression- she reports low mood, energy, anhedonia. has developed throbbing headache related to sleep deprivation and stress. was on zoloft previously and does not recall her response to this medication. No harmful ideation, no focal neuro deficit    Bilateral hand pain past 3-4 months, feels stiff at times knuckles are swollen. Takes otc nsaids with some relief      ROS:  See HPI, all others negative        Patient Active Problem List   Diagnosis Code    Migraine G43.909    Vitamin D deficiency E55.9    Prediabetes R73.03    Colon cancer screening Z12.11    History of hypertension Z86.79    Depression, major, recurrent, mild (HCC) F33.0       Current Outpatient Medications   Medication Sig Dispense Refill    aspirin/acetaminophen/caffeine (EXCEDRIN MIGRAINE PO) Take  by mouth.  DULoxetine (CYMBALTA) 30 mg capsule Take 1 Capsule by mouth daily. 90 Capsule 0    methylPREDNISolone (Medrol, Charles,) 4 mg tablet Per dose pack instructions (Patient not taking: Reported on 1/27/2022) 1 Dose Pack 0    cholecalciferol (VITAMIN D3) (2,000 UNITS /50 MCG) cap capsule Take 2,000 Units by mouth once over twenty-four (24) hours.  (Patient not taking: Reported on 1/27/2022) 90 Cap 3       No Known Allergies    Past Medical History:   Diagnosis Date    Anxiety     Depression     medication briefly a few years ago    Hypertension     previously on medication, stopped it on own about a year ago    Migraine     prn excedrine migraine    Pap smear about 10 years ago    no abnormals    Prediabetes 5/2015    Screening mammogram last about 2007    Vitamin D deficiency        Social History     Socioeconomic History    Marital status:      Spouse name: Not on file    Number of children: Not on file    Years of education: Not on file    Highest education level: Not on file   Occupational History    Not on file   Tobacco Use    Smoking status: Never Smoker    Smokeless tobacco: Never Used   Substance and Sexual Activity    Alcohol use: No    Drug use: No    Sexual activity: Not Currently     Partners: Male     Birth control/protection: None     Comment: not for past 2 years   Other Topics Concern    Not on file   Social History Narrative    Not on file     Social Determinants of Health     Financial Resource Strain:     Difficulty of Paying Living Expenses: Not on file   Food Insecurity:     Worried About Running Out of Food in the Last Year: Not on file    Jessica of Food in the Last Year: Not on file   Transportation Needs:     Lack of Transportation (Medical): Not on file    Lack of Transportation (Non-Medical):  Not on file   Physical Activity:     Days of Exercise per Week: Not on file    Minutes of Exercise per Session: Not on file   Stress:     Feeling of Stress : Not on file   Social Connections:     Frequency of Communication with Friends and Family: Not on file    Frequency of Social Gatherings with Friends and Family: Not on file    Attends Voodoo Services: Not on file    Active Member of [address] [place] [address] or Organizations: Not on file    Attends Club or Organization Meetings: Not on file    Marital Status: Not on file   Intimate Partner Violence:     Fear of Current or Ex-Partner: Not on file    Emotionally Abused: Not on file    Physically Abused: Not on file    Sexually Abused: Not on file   Housing Stability:     Unable to Pay for Housing in the Last Year: Not on file    Number of [place] in the Last Year: Not on file    Unstable Housing in the Last Year: Not on file       Family History   Problem Relation Age of Onset    Cancer Mother         non-hodgkins     Cancer Sister 28        breast    Breast Cancer Sister 39    Breast Cancer Maternal Grandmother          OBJECTIVE    Physical Exam:     Visit Vitals  /84 (BP 1 Location: Left upper arm, BP Patient Position: Sitting, BP Cuff Size: Adult)   Pulse 68   Temp 97.9 °F (36.6 °C) (Temporal)   Resp 16   Wt 193 lb (87.5 kg)   SpO2 97%   BMI 33.13 kg/m²       General: alert, well-appearing, in no apparent distress or pain  Neck: supple, no adenopathy palpated  CVS: normal rate, regular rhythm, distinct S1 and S2  Lungs:clear to ausculation bilaterally, no crackles, wheezing or rhonchi noted  Abdomen: normoactive bowel sounds, soft, non-tender  Extremities: hands no swelling or redness, L hand pain with firm   Skin: warm, no lesions, rashes noted  Psych:  mood and affect normal        ASSESSMENT/PLAN  Diagnoses and all orders for this visit:    1. Depression, major, recurrent, mild ([address]  Needs better control  Will try cymbalta with comorbid HA/hand pains    2. Vitamin D deficiency  -     VITAMIN D, 25 HYDROXY; Future    3. Prediabetes  -     METABOLIC PANEL, COMPREHENSIVE; Future  -     HEMOGLOBIN A1C WITH EAG; Future    4. Lipid screening  -     LIPID PANEL; Future    5. Bilateral hand pain  -     XR HAND LT MIN 3 V; Future  -     XR HAND RT MIN 3 V; Future  -     SED RATE (ESR); Future  -     RENETTA COMPREHENSIVE PANEL; Future  -     RHEUMATOID FACTOR, QT; Future  -     CBC WITH AUTOMATED DIFF; Future    6. Episodic tension-type headache, not intractable  -     DULoxetine (CYMBALTA) 30 mg capsule; Take 1 Capsule by mouth daily. Follow-up and Dispositions    · Return in about 4 weeks (around 2/24/2022), or if symptoms worsen or fail to improve, for fasting labs a week prior to your next visit, routine chronic illness care. Patient understands plan of care. Patient has provided input and agrees with goals.

## 2022-01-27 NOTE — PATIENT INSTRUCTIONS
A Healthy Heart: Care Instructions  Your Care Instructions     Coronary artery disease, also called heart disease, occurs when a substance called plaque builds up in the vessels that supply oxygen-rich blood to your heart muscle. This can narrow the blood vessels and reduce blood flow. A heart attack happens when blood flow is completely blocked. A high-fat diet, smoking, and other factors increase the risk of heart disease. Your doctor has found that you have a chance of having heart disease. You can do lots of things to keep your heart healthy. It may not be easy, but you can change your diet, exercise more, and quit smoking. These steps really work to lower your chance of heart disease. Follow-up care is a key part of your treatment and safety. Be sure to make and go to all appointments, and call your doctor if you are having problems. It's also a good idea to know your test results and keep a list of the medicines you take. How can you care for yourself at home? Diet    · Use less salt when you cook and eat. This helps lower your blood pressure. Taste food before salting. Add only a little salt when you think you need it. With time, your taste buds will adjust to less salt.     · Eat fewer snack items, fast foods, canned soups, and other high-salt, high-fat, processed foods.     · Read food labels and try to avoid saturated and trans fats. They increase your risk of heart disease by raising cholesterol levels.     · Limit the amount of solid fat-butter, margarine, and shortening-you eat. Use olive, peanut, or canola oil when you cook. Bake, broil, and steam foods instead of frying them.     · Eat a variety of fruit and vegetables every day. Dark green, deep orange, red, or yellow fruits and vegetables are especially good for you. Examples include spinach, carrots, peaches, and berries.     · Foods high in fiber can reduce your cholesterol and provide important vitamins and minerals.  High-fiber foods include whole-grain cereals and breads, oatmeal, beans, brown rice, citrus fruits, and apples.     · Eat lean proteins. Heart-healthy proteins include seafood, lean meats and poultry, eggs, beans, peas, nuts, seeds, and soy products.     · Limit drinks and foods with added sugar. These include candy, desserts, and soda pop. Lifestyle changes    · If your doctor recommends it, get more exercise. Walking is a good choice. Bit by bit, increase the amount you walk every day. Try for at least 30 minutes on most days of the week. You also may want to swim, bike, or do other activities.     · Do not smoke. If you need help quitting, talk to your doctor about stop-smoking programs and medicines. These can increase your chances of quitting for good. Quitting smoking may be the most important step you can take to protect your heart. It is never too late to quit.     · Limit alcohol to 2 drinks a day for men and 1 drink a day for women. Too much alcohol can cause health problems.     · Manage other health problems such as diabetes, high blood pressure, and high cholesterol. If you think you may have a problem with alcohol or drug use, talk to your doctor. Medicines    · Take your medicines exactly as prescribed. Call your doctor if you think you are having a problem with your medicine.     · If your doctor recommends aspirin, take the amount directed each day. Make sure you take aspirin and not another kind of pain reliever, such as acetaminophen (Tylenol). When should you call for help? Call 911 if you have symptoms of a heart attack. These may include:    · Chest pain or pressure, or a strange feeling in the chest.     · Sweating.     · Shortness of breath.     · Pain, pressure, or a strange feeling in the back, neck, jaw, or upper belly or in one or both shoulders or arms.     · Lightheadedness or sudden weakness.     · A fast or irregular heartbeat.    After you call 911, the  may tell you to chew 1 adult-strength or 2 to 4 low-dose aspirin. Wait for an ambulance. Do not try to drive yourself. Watch closely for changes in your health, and be sure to contact your doctor if you have any problems. Where can you learn more? Go to http://www.pratt.com/  Enter F075 in the search box to learn more about \"A Healthy Heart: Care Instructions. \"  Current as of: April 29, 2021               Content Version: 13.0  © 2006-2021 [employer]. Care instructions adapted under license by [employer] (which disclaims liability or warranty for this information). If you have questions about a medical condition or this instruction, always ask your healthcare professional. [address] any warranty or liability for your use of this information.

## 2022-01-27 NOTE — PROGRESS NOTES
1. Have you been to the ER, urgent care clinic since your last visit? Hospitalized since your last visit? No    2. Have you seen or consulted any other health care providers outside of the [address] [place] [address] since your last visit? Include any pap smears or colon screening.  No    Chief Complaint   Patient presents with    Depression    Vitamin D Deficiency    Cholesterol Problem    Hand Pain     bilat hand pain - \"my bones hurt\"     Headache

## 2022-02-25 ENCOUNTER — HOSPITAL ENCOUNTER (OUTPATIENT)
Dept: LAB | Age: 57
Discharge: HOME | End: 2022-02-25
Payer: COMMERCIAL

## 2022-02-25 DIAGNOSIS — M79.641 BILATERAL HAND PAIN: ICD-10-CM

## 2022-02-25 DIAGNOSIS — Z13.220 LIPID SCREENING: ICD-10-CM

## 2022-02-25 DIAGNOSIS — M79.642 BILATERAL HAND PAIN: ICD-10-CM

## 2022-02-25 DIAGNOSIS — E55.9 VITAMIN D DEFICIENCY: ICD-10-CM

## 2022-02-25 DIAGNOSIS — R73.03 PREDIABETES: ICD-10-CM

## 2022-02-25 LAB
25(OH)D3 SERPL-MCNC: 13.6 NG/ML (ref 30–100)
ALBUMIN SERPL-MCNC: 4 G/DL (ref 3.4–5)
ALBUMIN/GLOB SERPL: 1.1 {RATIO} (ref 0.8–1.7)
ALP SERPL-CCNC: 76 U/L (ref 45–117)
ALT SERPL-CCNC: 25 U/L (ref 13–56)
ANION GAP SERPL CALC-SCNC: 4 MMOL/L (ref 3–18)
AST SERPL-CCNC: 14 U/L (ref 10–38)
BASOPHILS # BLD: 0 K/UL (ref 0–0.1)
BASOPHILS NFR BLD: 1 % (ref 0–2)
BILIRUB SERPL-MCNC: 1.2 MG/DL (ref 0.2–1)
BUN SERPL-MCNC: 18 MG/DL (ref 7–18)
BUN/CREAT SERPL: 28 (ref 12–20)
CALCIUM SERPL-MCNC: 9.2 MG/DL (ref 8.5–10.1)
CHLORIDE SERPL-SCNC: 106 MMOL/L (ref 100–111)
CHOLEST SERPL-MCNC: 226 MG/DL
CO2 SERPL-SCNC: 30 MMOL/L (ref 21–32)
CREAT SERPL-MCNC: 0.65 MG/DL (ref 0.6–1.3)
DIFFERENTIAL METHOD BLD: ABNORMAL
EOSINOPHIL # BLD: 0.2 K/UL (ref 0–0.4)
EOSINOPHIL NFR BLD: 4 % (ref 0–5)
ERYTHROCYTE [DISTWIDTH] IN BLOOD BY AUTOMATED COUNT: 12.3 % (ref 11.6–14.5)
ERYTHROCYTE [SEDIMENTATION RATE] IN BLOOD: 8 MM/HR (ref 0–30)
EST. AVERAGE GLUCOSE BLD GHB EST-MCNC: 111 MG/DL
GLOBULIN SER CALC-MCNC: 3.5 G/DL (ref 2–4)
GLUCOSE SERPL-MCNC: 95 MG/DL (ref 74–99)
HBA1C MFR BLD: 5.5 % (ref 4.2–5.6)
HCT VFR BLD AUTO: 45.7 % (ref 35–45)
HDLC SERPL-MCNC: 56 MG/DL (ref 40–60)
HDLC SERPL: 4 {RATIO} (ref 0–5)
HGB BLD-MCNC: 14.7 G/DL (ref 12–16)
IMM GRANULOCYTES # BLD AUTO: 0 K/UL (ref 0–0.04)
IMM GRANULOCYTES NFR BLD AUTO: 0 % (ref 0–0.5)
LDLC SERPL CALC-MCNC: 148.8 MG/DL (ref 0–100)
LIPID PROFILE,FLP: ABNORMAL
LYMPHOCYTES # BLD: 1.5 K/UL (ref 0.9–3.6)
LYMPHOCYTES NFR BLD: 35 % (ref 21–52)
MCH RBC QN AUTO: 27.9 PG (ref 24–34)
MCHC RBC AUTO-ENTMCNC: 32.2 G/DL (ref 31–37)
MCV RBC AUTO: 86.9 FL (ref 78–100)
MONOCYTES # BLD: 0.4 K/UL (ref 0.05–1.2)
MONOCYTES NFR BLD: 9 % (ref 3–10)
NEUTS SEG # BLD: 2.2 K/UL (ref 1.8–8)
NEUTS SEG NFR BLD: 51 % (ref 40–73)
NRBC # BLD: 0 K/UL (ref 0–0.01)
NRBC BLD-RTO: 0 PER 100 WBC
PLATELET # BLD AUTO: 211 K/UL (ref 135–420)
PMV BLD AUTO: 10.7 FL (ref 9.2–11.8)
POTASSIUM SERPL-SCNC: 4.2 MMOL/L (ref 3.5–5.5)
PROT SERPL-MCNC: 7.5 G/DL (ref 6.4–8.2)
RBC # BLD AUTO: 5.26 M/UL (ref 4.2–5.3)
RHEUMATOID FACT SERPL-ACNC: <10 IU/ML
SODIUM SERPL-SCNC: 140 MMOL/L (ref 136–145)
TRIGL SERPL-MCNC: 106 MG/DL (ref ?–150)
VLDLC SERPL CALC-MCNC: 21.2 MG/DL
WBC # BLD AUTO: 4.2 K/UL (ref 4.6–13.2)

## 2022-02-25 PROCEDURE — 80061 LIPID PANEL: CPT

## 2022-02-25 PROCEDURE — 85025 COMPLETE CBC W/AUTO DIFF WBC: CPT

## 2022-02-25 PROCEDURE — 82306 VITAMIN D 25 HYDROXY: CPT

## 2022-02-25 PROCEDURE — 86225 DNA ANTIBODY NATIVE: CPT

## 2022-02-25 PROCEDURE — 83036 HEMOGLOBIN GLYCOSYLATED A1C: CPT

## 2022-02-25 PROCEDURE — 36415 COLL VENOUS BLD VENIPUNCTURE: CPT

## 2022-02-25 PROCEDURE — 86431 RHEUMATOID FACTOR QUANT: CPT

## 2022-02-25 PROCEDURE — 85652 RBC SED RATE AUTOMATED: CPT

## 2022-02-25 PROCEDURE — 80053 COMPREHEN METABOLIC PANEL: CPT

## 2022-02-28 LAB
CENTROMERE B AB SER-ACNC: <0.2 AI (ref 0–0.9)
CHROMATIN AB SERPL-ACNC: <0.2 AI (ref 0–0.9)
DSDNA AB SER-ACNC: <1 IU/ML (ref 0–9)
ENA JO1 AB SER-ACNC: <0.2 AI (ref 0–0.9)
ENA RNP AB SER-ACNC: <0.2 AI (ref 0–0.9)
ENA SCL70 AB SER-ACNC: <0.2 AI (ref 0–0.9)
ENA SM AB SER-ACNC: <0.2 AI (ref 0–0.9)
ENA SS-A AB SER-ACNC: <0.2 AI (ref 0–0.9)
ENA SS-B AB SER-ACNC: <0.2 AI (ref 0–0.9)
SEE BELOW, 164869: NORMAL

## 2022-03-03 ENCOUNTER — OFFICE VISIT (OUTPATIENT)
Dept: FAMILY MEDICINE CLINIC | Age: 57
End: 2022-03-03
Payer: COMMERCIAL

## 2022-03-03 VITALS
DIASTOLIC BLOOD PRESSURE: 86 MMHG | RESPIRATION RATE: 16 BRPM | HEART RATE: 67 BPM | OXYGEN SATURATION: 97 % | SYSTOLIC BLOOD PRESSURE: 136 MMHG | BODY MASS INDEX: 32.61 KG/M2 | WEIGHT: 190 LBS | TEMPERATURE: 97.9 F

## 2022-03-03 DIAGNOSIS — G44.219 EPISODIC TENSION-TYPE HEADACHE, NOT INTRACTABLE: ICD-10-CM

## 2022-03-03 DIAGNOSIS — E78.9 BORDERLINE HIGH CHOLESTEROL: ICD-10-CM

## 2022-03-03 DIAGNOSIS — F33.9 RECURRENT DEPRESSION (HCC): Primary | ICD-10-CM

## 2022-03-03 DIAGNOSIS — M79.642 BILATERAL HAND PAIN: ICD-10-CM

## 2022-03-03 DIAGNOSIS — Z12.11 COLON CANCER SCREENING: ICD-10-CM

## 2022-03-03 DIAGNOSIS — M79.641 BILATERAL HAND PAIN: ICD-10-CM

## 2022-03-03 DIAGNOSIS — E55.9 VITAMIN D DEFICIENCY: ICD-10-CM

## 2022-03-03 PROCEDURE — 99214 OFFICE O/P EST MOD 30 MIN: CPT | Performed by: FAMILY MEDICINE

## 2022-03-03 RX ORDER — ERGOCALCIFEROL 1.25 MG/1
50000 CAPSULE ORAL
Qty: 12 CAPSULE | Refills: 0 | Status: SHIPPED | OUTPATIENT
Start: 2022-03-03 | End: 2022-08-16

## 2022-03-03 NOTE — PROGRESS NOTES
1. Have you been to the ER, urgent care clinic since your last visit? Hospitalized since your last visit? No    2. Have you seen or consulted any other health care providers outside of the [address] [place] [address] since your last visit? Include any pap smears or colon screening.  No    Chief Complaint   Patient presents with    Depression    Vitamin D Deficiency    Blood sugar problem    Headache     tension headache

## 2022-03-03 NOTE — PROGRESS NOTES
Jono Harley, 61 y.o.,  female    SUBJECTIVE  Ff-up    Depression- she reports much improved mood, energy, anhedonia. and less headaches after starting cymbalta    Bilateral hand pain inflammatory screening/xray neg. Noted vitamin D def    ROS:  See HPI, all others negative        Patient Active Problem List   Diagnosis Code    Migraine G43.909    Vitamin D deficiency E55.9    Prediabetes R73.03    Colon cancer screening Z12.11    History of hypertension Z86.79    Depression, major, recurrent, mild (HCC) F33.0       Current Outpatient Medications   Medication Sig Dispense Refill    ergocalciferol (ERGOCALCIFEROL) 1,250 mcg (50,000 unit) capsule Take 1 Capsule by mouth every seven (7) days. 12 Capsule 0    aspirin/acetaminophen/caffeine (EXCEDRIN MIGRAINE PO) Take  by mouth.  DULoxetine (CYMBALTA) 30 mg capsule Take 1 Capsule by mouth daily.  90 Capsule 0       No Known Allergies    Past Medical History:   Diagnosis Date    Anxiety     Depression     medication briefly a few years ago    Hypertension     previously on medication, stopped it on own about a year ago    Migraine     prn excedrine migraine    Pap smear about 10 years ago    no abnormals    Prediabetes 5/2015    Screening mammogram last about 2007    Vitamin D deficiency        Social History     Socioeconomic History    Marital status:      Spouse name: Not on file    Number of children: Not on file    Years of education: Not on file    Highest education level: Not on file   Occupational History    Not on file   Tobacco Use    Smoking status: Never Smoker    Smokeless tobacco: Never Used   Substance and Sexual Activity    Alcohol use: No    Drug use: No    Sexual activity: Not Currently     Partners: Male     Birth control/protection: None     Comment: not for past 2 years   Other Topics Concern    Not on file   Social History Narrative    Not on file     Social Determinants of Health     Financial Resource Strain:     Difficulty of Paying Living Expenses: Not on file   Food Insecurity:     Worried About Running Out of Food in the Last Year: Not on file    Jessica of Food in the Last Year: Not on file   Transportation Needs:     Lack of Transportation (Medical): Not on file    Lack of Transportation (Non-Medical):  Not on file   Physical Activity:     Days of Exercise per Week: Not on file    Minutes of Exercise per Session: Not on file   Stress:     Feeling of Stress : Not on file   Social Connections:     Frequency of Communication with Friends and Family: Not on file    Frequency of Social Gatherings with Friends and Family: Not on file    Attends Taoism Services: Not on file    Active Member of [address] [place] [address] [employer] or Organizations: Not on file    Attends Club or Organization Meetings: Not on file    Marital Status: Not on file   Intimate Partner Violence:     Fear of Current or Ex-Partner: Not on file    Emotionally Abused: Not on file    Physically Abused: Not on file    Sexually Abused: Not on file   Housing Stability:     Unable to Pay for Housing in the Last Year: Not on file    Number of [place] in the Last Year: Not on file    Unstable Housing in the Last Year: Not on file       Family History   Problem Relation Age of Onset    Cancer Mother         non-hodgkins     Cancer Sister 28        breast    Breast Cancer Sister 39    Breast Cancer Maternal Grandmother          OBJECTIVE    Physical Exam:     Visit Vitals  /86 (BP 1 Location: Left upper arm, BP Patient Position: Sitting, BP Cuff Size: Adult)   Pulse 67   Temp 97.9 °F (36.6 °C) (Temporal)   Resp 16   Wt 190 lb (86.2 kg)   SpO2 97%   BMI 32.61 kg/m²       General: alert, well-appearing, in no apparent distress or pain  Neck: supple, no adenopathy palpated  CVS: normal rate, regular rhythm, distinct S1 and S2  Lungs:clear to ausculation bilaterally, no crackles, wheezing or rhonchi noted  Abdomen: normoactive bowel sounds, soft, non-tender  Extremities: hands no swelling or redness, L hand pain with firm   Skin: warm, no lesions, rashes noted  Psych:  mood and affect normal        ASSESSMENT/PLAN  Diagnoses and all orders for this visit:    1. Recurrent depression ([address]  Improving  Cont cymbalta    2. Colon cancer screening  -     REFERRAL TO GASTROENTEROLOGY    3. Vitamin D deficiency  Replete 50k weekly  Recheck prior to next visit  -     VITAMIN D, 25 HYDROXY; Future    4. Episodic tension-type headache, not intractable  Improved   See #1    5. Borderline high cholesterol  Calc cv risk 2.8% vs 1.7% for age  Advised plant based/mediterranean diet    6. Bilateral hand pain  Likely OA, neg inflammatory arthritis work up  [employer], prn tylenol    Other orders  -     ergocalciferol (ERGOCALCIFEROL) 1,250 mcg (50,000 unit) capsule; Take 1 Capsule by mouth every seven (7) days. Follow-up and Dispositions    · Return in about 3 months (around 6/3/2022), or if symptoms worsen or fail to improve, for non-fasting labs prior to your next visit, plan for Complete physical on next visit. Patient understands plan of care. Patient has provided input and agrees with goals.

## 2022-03-03 NOTE — PATIENT INSTRUCTIONS
A Healthy Heart: Care Instructions  Your Care Instructions     Coronary artery disease, also called heart disease, occurs when a substance called plaque builds up in the vessels that supply oxygen-rich blood to your heart muscle. This can narrow the blood vessels and reduce blood flow. A heart attack happens when blood flow is completely blocked. A high-fat diet, smoking, and other factors increase the risk of heart disease. Your doctor has found that you have a chance of having heart disease. You can do lots of things to keep your heart healthy. It may not be easy, but you can change your diet, exercise more, and quit smoking. These steps really work to lower your chance of heart disease. Follow-up care is a key part of your treatment and safety. Be sure to make and go to all appointments, and call your doctor if you are having problems. It's also a good idea to know your test results and keep a list of the medicines you take. How can you care for yourself at home? Diet    · Use less salt when you cook and eat. This helps lower your blood pressure. Taste food before salting. Add only a little salt when you think you need it. With time, your taste buds will adjust to less salt.     · Eat fewer snack items, fast foods, canned soups, and other high-salt, high-fat, processed foods.     · Read food labels and try to avoid saturated and trans fats. They increase your risk of heart disease by raising cholesterol levels.     · Limit the amount of solid fat-butter, margarine, and shortening-you eat. Use olive, peanut, or canola oil when you cook. Bake, broil, and steam foods instead of frying them.     · Eat a variety of fruit and vegetables every day. Dark green, deep orange, red, or yellow fruits and vegetables are especially good for you. Examples include spinach, carrots, peaches, and berries.     · Foods high in fiber can reduce your cholesterol and provide important vitamins and minerals.  High-fiber foods include whole-grain cereals and breads, oatmeal, beans, brown rice, citrus fruits, and apples.     · Eat lean proteins. Heart-healthy proteins include seafood, lean meats and poultry, eggs, beans, peas, nuts, seeds, and soy products.     · Limit drinks and foods with added sugar. These include candy, desserts, and soda pop. Lifestyle changes    · If your doctor recommends it, get more exercise. Walking is a good choice. Bit by bit, increase the amount you walk every day. Try for at least 30 minutes on most days of the week. You also may want to swim, bike, or do other activities.     · Do not smoke. If you need help quitting, talk to your doctor about stop-smoking programs and medicines. These can increase your chances of quitting for good. Quitting smoking may be the most important step you can take to protect your heart. It is never too late to quit.     · Limit alcohol to 2 drinks a day for men and 1 drink a day for women. Too much alcohol can cause health problems.     · Manage other health problems such as diabetes, high blood pressure, and high cholesterol. If you think you may have a problem with alcohol or drug use, talk to your doctor. Medicines    · Take your medicines exactly as prescribed. Call your doctor if you think you are having a problem with your medicine.     · If your doctor recommends aspirin, take the amount directed each day. Make sure you take aspirin and not another kind of pain reliever, such as acetaminophen (Tylenol). When should you call for help? Call 911 if you have symptoms of a heart attack. These may include:    · Chest pain or pressure, or a strange feeling in the chest.     · Sweating.     · Shortness of breath.     · Pain, pressure, or a strange feeling in the back, neck, jaw, or upper belly or in one or both shoulders or arms.     · Lightheadedness or sudden weakness.     · A fast or irregular heartbeat.    After you call 911, the  may tell you to chew 1 adult-strength or 2 to 4 low-dose aspirin. Wait for an ambulance. Do not try to drive yourself. Watch closely for changes in your health, and be sure to contact your doctor if you have any problems. Where can you learn more? Go to http://www.pratt.com/  Enter F075 in the search box to learn more about \"A Healthy Heart: Care Instructions. \"  Current as of: April 29, 2021               Content Version: 13.0  © 2006-2021 [employer]. Care instructions adapted under license by [employer] (which disclaims liability or warranty for this information). If you have questions about a medical condition or this instruction, always ask your healthcare professional. [address] any warranty or liability for your use of this information.

## 2022-03-07 NOTE — PATIENT INSTRUCTIONS
Hamstring Strain: Rehab Exercises  Your Care Instructions  Here are some examples of typical rehabilitation exercises for your condition. Start each exercise slowly. Ease off the exercise if you start to have pain. Your doctor or physical therapist will tell you when you can start these exercises and which ones will work best for you. How to do the exercises  Hamstring set (heel dig)    1. Sit with your affected leg bent. Your good leg should be straight and supported on the floor. 2. Tighten the muscles on the back of your bent leg (hamstring) by pressing your heel into the floor. 3. Hold for about 6 seconds, and then rest for up to 10 seconds. 4. Repeat 8 to 12 times. Hamstring curl    1. Lie on your stomach with your knees straight. Place a pillow under your stomach. If your kneecap is uncomfortable, roll up a washcloth and put it under your leg just above your kneecap. 2. Lift the foot of your affected leg by bending your knee so that you bring your foot up toward your buttock. If this motion hurts, try it without bending your knee quite as far. This may help you avoid any painful motion. 3. Slowly move your leg up and down. 4. Repeat 8 to 12 times. 5. When you can do this exercise with ease and no pain, add some resistance. To do this:  6. Tie the ends of an exercise band together to form a loop. Attach one end of the loop to a secure object or shut a door on it to hold it in place. (Or you can have someone hold one end of the loop to provide resistance.)  7. Loop the other end of the exercise band around the lower part of your affected leg. 8. Repeat steps 1 through 4, slowly pulling back on the exercise band with your leg. Hip extension    1. Stand facing a wall with your hands on the wall at about chest level. 2. Keeping the knee of your affected leg straight, kick that leg straight back behind you. 3. Relax, and lower your leg back to the starting position.   4. Repeat 8 to 12 times.  5. When you can do this exercise with ease and no pain, add some resistance. To do this:  6. Tie the ends of an exercise band together to form a loop. Attach one end of the loop to a secure object or shut a door on it to hold it in place. (Or you can have someone hold one end of the loop to provide resistance.)  7. Loop the other end of the exercise band around the lower part of your affected leg. 8. Repeat steps 1 through 4, slowly pulling back on the exercise band with your leg. Hamstring wall stretch    1. Lie on your back in a doorway, with your good leg through the open door. 2. Slide your affected leg up the wall to straighten your knee. You should feel a gentle stretch down the back of your leg. 1. Do not arch your back. 2. Do not bend either knee. 3. Keep one heel touching the floor and the other heel touching the wall. Do not point your toes. 3. Hold the stretch for at least 1 minute to begin. Then try to lengthen the time you hold the stretch to as long as 6 minutes. 4. Repeat 2 to 4 times. 5. If you do not have a place to do this exercise in a doorway, there is another way to do it:  6. Lie on your back, and bend the knee of your affected leg. 7. Loop a towel under the ball and toes of that foot, and hold the ends of the towel in your hands. 8. Straighten your knee, and slowly pull back on the towel. You should feel a gentle stretch down the back of your leg. 9. Hold the stretch for 15 to 30 seconds. Or even better, hold the stretch for 1 minute if you can. 10. Repeat 2 to 4 times. Calf stretch    1. Stand facing a wall with your hands on the wall at about eye level. Put your affected leg about a step behind your other leg. 2. Keeping your back leg straight and your back heel on the floor, bend your front knee and gently bring your hip and chest toward the wall until you feel a stretch in the calf of your back leg. 3. Hold the stretch for 15 to 30 seconds.   4. Repeat 2 to 4 times.  5. Repeat steps 1 through 4, but this time keep your back knee bent. Single-leg balance    1. Stand on a flat surface with your arms stretched out to your sides like you are making the letter \"T. \" Then lift your good leg off the floor, bending it at the knee. If you are not steady on your feet, use one hand to hold on to a chair, counter, or wall. 2. Standing on your affected leg, keep that knee straight. Try to balance on that leg for up to 30 seconds. Then rest for up to 10 seconds. 3. Repeat 6 to 8 times. 4. When you can balance on your affected leg for 30 seconds with your eyes open, try to balance on it with your eyes closed. 5. When you can do this exercise with your eyes closed for 30 seconds and with ease and no pain, try standing on a pillow or piece of foam, and repeat steps 1 through 4. Follow-up care is a key part of your treatment and safety. Be sure to make and go to all appointments, and call your doctor if you are having problems. It's also a good idea to know your test results and keep a list of the medicines you take. Where can you learn more? Go to http://beata-ryan.info/. Enter 967 [id number] [id number] in the search box to learn more about \"Hamstring Strain: Rehab Exercises. \"  Current as of: March 21, 2017  Content Version: 11.4  © 5531-6014 [employer]. Care instructions adapted under license by [employer] (which disclaims liability or warranty for this information). If you have questions about a medical condition or this instruction, always ask your healthcare professional. [address] any warranty or liability for your use of this information. Not applicable

## 2022-03-18 PROBLEM — Z86.79 HISTORY OF HYPERTENSION: Status: ACTIVE | Noted: 2018-01-22

## 2022-03-20 PROBLEM — F33.0 DEPRESSION, MAJOR, RECURRENT, MILD (HCC): Status: ACTIVE | Noted: 2020-03-19

## 2022-03-25 ENCOUNTER — TELEPHONE (OUTPATIENT)
Dept: FAMILY MEDICINE CLINIC | Age: 57
End: 2022-03-25

## 2022-03-25 RX ORDER — SUMATRIPTAN 25 MG/1
25 TABLET, FILM COATED ORAL
Qty: 12 TABLET | Refills: 0 | Status: SHIPPED | OUTPATIENT
Start: 2022-03-25 | End: 2022-03-25

## 2022-03-25 NOTE — TELEPHONE ENCOUNTER
Called patient. No answer. Left detailed message on voicemail that states Addis. Advised patient that Dr. Cervantes [place]  Has approved request for imitrex. If not feeling any better to schedule a sooner appt than June.

## 2022-03-25 NOTE — TELEPHONE ENCOUNTER
Pt states that she has had a severe headache for about 2dya and she use to get Imitrex to get rid of them and would like to know if Dr Heber Oliveira would call in a RX for Imitrex to the [facility]. [phone]. please advise.

## 2022-06-01 ENCOUNTER — HOSPITAL ENCOUNTER (OUTPATIENT)
Dept: LAB | Age: 57
Discharge: HOME | End: 2022-06-01
Payer: COMMERCIAL

## 2022-06-01 DIAGNOSIS — E55.9 VITAMIN D DEFICIENCY: ICD-10-CM

## 2022-06-01 LAB — 25(OH)D3 SERPL-MCNC: 41.7 NG/ML (ref 30–100)

## 2022-06-01 PROCEDURE — 82306 VITAMIN D 25 HYDROXY: CPT

## 2022-06-01 PROCEDURE — 36415 COLL VENOUS BLD VENIPUNCTURE: CPT

## 2022-06-06 ENCOUNTER — OFFICE VISIT (OUTPATIENT)
Dept: FAMILY MEDICINE CLINIC | Age: 57
End: 2022-06-06
Payer: COMMERCIAL

## 2022-06-06 VITALS
TEMPERATURE: 98.7 F | DIASTOLIC BLOOD PRESSURE: 84 MMHG | HEIGHT: 64 IN | HEART RATE: 61 BPM | RESPIRATION RATE: 16 BRPM | WEIGHT: 190 LBS | OXYGEN SATURATION: 96 % | SYSTOLIC BLOOD PRESSURE: 136 MMHG | BODY MASS INDEX: 32.44 KG/M2

## 2022-06-06 DIAGNOSIS — Z01.419 WELL WOMAN EXAM WITH ROUTINE GYNECOLOGICAL EXAM: Primary | ICD-10-CM

## 2022-06-06 DIAGNOSIS — G44.219 EPISODIC TENSION-TYPE HEADACHE, NOT INTRACTABLE: ICD-10-CM

## 2022-06-06 DIAGNOSIS — F33.9 RECURRENT DEPRESSION (HCC): ICD-10-CM

## 2022-06-06 DIAGNOSIS — E55.9 VITAMIN D DEFICIENCY: ICD-10-CM

## 2022-06-06 DIAGNOSIS — E78.9 BORDERLINE HIGH CHOLESTEROL: ICD-10-CM

## 2022-06-06 PROCEDURE — 99396 PREV VISIT EST AGE 40-64: CPT | Performed by: FAMILY MEDICINE

## 2022-06-06 NOTE — PATIENT INSTRUCTIONS
A Healthy Heart: Care Instructions  Your Care Instructions     Coronary artery disease, also called heart disease, occurs when a substance called plaque builds up in the vessels that supply oxygen-rich blood to your heart muscle. This can narrow the blood vessels and reduce blood flow. A heart attack happens when blood flow is completely blocked. A high-fat diet, smoking, and other factors increase the risk of heart disease. Your doctor has found that you have a chance of having heart disease. You can do lots of things to keep your heart healthy. It may not be easy, but you can change your diet, exercise more, and quit smoking. These steps really work to lower your chance of heart disease. Follow-up care is a key part of your treatment and safety. Be sure to make and go to all appointments, and call your doctor if you are having problems. It's also a good idea to know your test results and keep a list of the medicines you take. How can you care for yourself at home? Diet    · Use less salt when you cook and eat. This helps lower your blood pressure. Taste food before salting. Add only a little salt when you think you need it. With time, your taste buds will adjust to less salt.     · Eat fewer snack items, fast foods, canned soups, and other high-salt, high-fat, processed foods.     · Read food labels and try to avoid saturated and trans fats. They increase your risk of heart disease by raising cholesterol levels.     · Limit the amount of solid fat-butter, margarine, and shortening-you eat. Use olive, peanut, or canola oil when you cook. Bake, broil, and steam foods instead of frying them.     · Eat a variety of fruit and vegetables every day. Dark green, deep orange, red, or yellow fruits and vegetables are especially good for you. Examples include spinach, carrots, peaches, and berries.     · Foods high in fiber can reduce your cholesterol and provide important vitamins and minerals.  High-fiber foods include whole-grain cereals and breads, oatmeal, beans, brown rice, citrus fruits, and apples.     · Eat lean proteins. Heart-healthy proteins include seafood, lean meats and poultry, eggs, beans, peas, nuts, seeds, and soy products.     · Limit drinks and foods with added sugar. These include candy, desserts, and soda pop. Lifestyle changes    · If your doctor recommends it, get more exercise. Walking is a good choice. Bit by bit, increase the amount you walk every day. Try for at least 30 minutes on most days of the week. You also may want to swim, bike, or do other activities.     · Do not smoke. If you need help quitting, talk to your doctor about stop-smoking programs and medicines. These can increase your chances of quitting for good. Quitting smoking may be the most important step you can take to protect your heart. It is never too late to quit.     · Limit alcohol to 2 drinks a day for men and 1 drink a day for women. Too much alcohol can cause health problems.     · Manage other health problems such as diabetes, high blood pressure, and high cholesterol. If you think you may have a problem with alcohol or drug use, talk to your doctor. Medicines    · Take your medicines exactly as prescribed. Call your doctor if you think you are having a problem with your medicine.     · If your doctor recommends aspirin, take the amount directed each day. Make sure you take aspirin and not another kind of pain reliever, such as acetaminophen (Tylenol). When should you call for help? Call 911 if you have symptoms of a heart attack. These may include:    · Chest pain or pressure, or a strange feeling in the chest.     · Sweating.     · Shortness of breath.     · Pain, pressure, or a strange feeling in the back, neck, jaw, or upper belly or in one or both shoulders or arms.     · Lightheadedness or sudden weakness.     · A fast or irregular heartbeat.    After you call 911, the  may tell you to chew 1 adult-strength or 2 to 4 low-dose aspirin. Wait for an ambulance. Do not try to drive yourself. Watch closely for changes in your health, and be sure to contact your doctor if you have any problems. Where can you learn more? Go to http://www.pratt.com/  Enter F075 in the search box to learn more about \"A Healthy Heart: Care Instructions. \"  Current as of: January 10, 2022               Content Version: 13.2  © 2006-2022 [employer]. Care instructions adapted under license by [employer] (which disclaims liability or warranty for this information). If you have questions about a medical condition or this instruction, always ask your healthcare professional. [address] any warranty or liability for your use of this information.

## 2022-06-06 NOTE — PROGRESS NOTES
Subjective:   61 y.o. female for Well Woman Check. No LMP recorded. Patient is perimenopausal.    Social History: single partner, contraception - post menopausal status. Depression- reports to be doing well, continues to take cymbalta, finding this helpful with headaches, infrequent flares. Vitamin d def- reviewed labs now wnl    Patient Active Problem List    Diagnosis Date Noted    Depression, major, recurrent, mild ([facility] [address] 03/19/2020    History of hypertension 01/22/2018    Colon cancer screening 09/21/2016    Prediabetes 11/09/2015    Vitamin D deficiency 04/17/2014    Migraine 02/01/2013     Current Outpatient Medications   Medication Sig Dispense Refill    ergocalciferol (ERGOCALCIFEROL) 1,250 mcg (50,000 unit) capsule Take 1 Capsule by mouth every seven (7) days. 12 Capsule 0    aspirin/acetaminophen/caffeine (EXCEDRIN MIGRAINE PO) Take  by mouth.  DULoxetine (CYMBALTA) 30 mg capsule Take 1 Capsule by mouth daily. 90 Capsule 0     No Known Allergies  Past Medical History:   Diagnosis Date    Anxiety     Depression     medication briefly a few years ago    Hypertension     previously on medication, stopped it on own about a year ago    Migraine     prn excedrine migraine    Pap smear about 10 years ago    no abnormals    Prediabetes 5/2015    Screening mammogram last about 2007    Vitamin D deficiency      History reviewed. No pertinent surgical history. Family History   Problem Relation Age of Onset    Cancer Mother         non-hodgkins     Cancer Sister 28        breast    Breast Cancer Sister 39    Breast Cancer Maternal Grandmother      Social History     Tobacco Use    Smoking status: Never Smoker    Smokeless tobacco: Never Used   Substance Use Topics    Alcohol use: No        ROS:  Feeling well. No dyspnea or chest pain on exertion. No abdominal pain, change in bowel habits, black or bloody stools. No urinary tract symptoms.  GYN ROS: no breast pain or new or enlarging lumps on self exam. No neurological complaints. Objective:     Visit Vitals  /84 (BP 1 Location: Left upper arm, BP Patient Position: Sitting, BP Cuff Size: Adult)   Pulse 61   Temp 98.7 °F (37.1 °C) (Temporal)   Resp 16   Ht 5' 4\" (1.626 m)   Wt 190 lb (86.2 kg)   SpO2 96%   BMI 32.61 kg/m²     The patient appears well, alert, oriented x 3, in no distress. ENT normal.  Neck supple. No adenopathy or thyromegaly. JIAN. Lungs are clear, good air entry, no wheezes, rhonchi or rales. S1 and S2 normal, no murmurs, regular rate and rhythm. Abdomen soft without tenderness, guarding, mass or organomegaly. Extremities show no edema, normal peripheral pulses. Neurological is normal, no focal findings. BREAST EXAM: breasts appear normal, no suspicious masses, no skin or nipple changes or axillary nodes    PELVIC EXAM: examination not indicated  Results for orders placed or performed during the hospital encounter of 06/01/22   VITAMIN D, 25 HYDROXY   Result Value Ref Range    Vitamin D 25-Hydroxy 41.7 30 - 100 ng/mL       Assessment/Plan:   Diagnoses and all orders for this visit:    1. Well woman exam with routine gynecological exam  well woman  Mammogram 12/2021  pap smear 3/2020 update 2023  CRCS due- previously referred, provided #  return annually or prn  reviewed diet, exercise and weight control. 2. Vitamin D deficiency  Now wnl  Switch to 1k iu od otc  Monitoring  -     VITAMIN D, 25 HYDROXY; Future    3. Borderline high cholesterol  Tlcs, monitoring  -     METABOLIC PANEL, COMPREHENSIVE; Future  -     LIPID PANEL; Future    4. Recurrent depression (HCC)  Stable  Cont  cymbalta    5. Episodic tension-type headache, not intractable  Stable  Cont cymbalta    Ff-up in 6 months or sooner prn    Patient/guardian understands plan of care. Patient has provided input and agrees with goals. Future labs to be discussed on next visit.

## 2022-06-06 NOTE — PROGRESS NOTES
1. Have you been to the ER, urgent care clinic since your last visit? Hospitalized since your last visit? No    2. Have you seen or consulted any other health care providers outside of the [address] [place] [address] since your last visit? Include any pap smears or colon screening. No      LMP;NA  Contraception type:NA  Vaginal problems:no  Last mammogram:12/30/2022  Last Pap:03/19/2020  Last Tdap:02/01/2013  Family hx of ovarian ca. no  Family hx of breast ca:sister  Family hx of colon ca:no

## 2022-08-16 NOTE — PERIOP NOTES
PRE-SURGICAL INSTRUCTIONS        Patient's Name:  Beth Ernandez      HCXRM'M Date:  8/16/2022         Surgery Date:  8/18/2022                Do NOT eat or drink anything, including candy, gum, or ice chips after midnight on 8/18/22, unless you have specific instructions from your surgeon or anesthesia provider to do so. You may brush your teeth before coming to the hospital.  No smoking 24 hours prior to the day of surgery. No alcohol 24 hours prior to the day of surgery. No recreational drugs for one week prior to the day of surgery. Leave all valuables, including money/purse, at home. Remove all jewelry, nail polish, acrylic nails, and makeup (including mascara); no lotions powders, deodorant, or perfume/cologne/after shave on the skin. Follow instruction for Hibiclens washes and CHG wipes from surgeon's office. Glasses/contact lenses and dentures may be worn to the hospital.  They will be removed prior to surgery. Call your doctor if symptoms of a cold or illness develop within 24-48 hours prior to your surgery. 11.  If you are having an outpatient procedure, please make arrangements for a responsible ADULT TO [address] [place] [address] and stay with you for 24 hours after your surgery. 12. ONE VISITOR in the hospital at this time for outpatient procedures. Exceptions may be made for surgical admissions, per nursing unit guidelines      Special Instructions:      Bring list of CURRENT medications. Bring any pertinent legal medical records. Take these medications the morning of surgery with a sip of water:  none      Complete bowel prep per MD instructions. On the day of surgery, come in the main entrance of [facility]. Let the  at the desk know you are there for surgery. A staff member will come escort you to the surgical area on the second floor.     If you have any questions or concerns, please do not hesitate to call:     (Prior to the day of surgery) [facility] department:  [phone]   (Day of surgery) Pre-Op department:  [phone]    These surgical instructions were reviewed with Matthew Morgan during the [facility] phone call.

## 2022-08-17 ENCOUNTER — ANESTHESIA EVENT (OUTPATIENT)
Dept: ENDOSCOPY | Age: 57
End: 2022-08-17
Payer: COMMERCIAL

## 2022-08-18 ENCOUNTER — HOSPITAL ENCOUNTER (OUTPATIENT)
Age: 57
Setting detail: OUTPATIENT SURGERY
Discharge: HOME | End: 2022-08-18
Attending: STUDENT IN AN ORGANIZED HEALTH CARE EDUCATION/TRAINING PROGRAM | Admitting: STUDENT IN AN ORGANIZED HEALTH CARE EDUCATION/TRAINING PROGRAM
Payer: COMMERCIAL

## 2022-08-18 ENCOUNTER — ANESTHESIA (OUTPATIENT)
Dept: ENDOSCOPY | Age: 57
End: 2022-08-18
Payer: COMMERCIAL

## 2022-08-18 VITALS
RESPIRATION RATE: 20 BRPM | WEIGHT: 189 LBS | DIASTOLIC BLOOD PRESSURE: 72 MMHG | BODY MASS INDEX: 33.49 KG/M2 | HEART RATE: 64 BPM | HEIGHT: 63 IN | TEMPERATURE: 97.2 F | OXYGEN SATURATION: 96 % | SYSTOLIC BLOOD PRESSURE: 114 MMHG

## 2022-08-18 PROCEDURE — 77030013992 HC SNR POLYP ENDOSC BSC -B: Performed by: STUDENT IN AN ORGANIZED HEALTH CARE EDUCATION/TRAINING PROGRAM

## 2022-08-18 PROCEDURE — 76060000031 HC ANESTHESIA FIRST 0.5 HR: Performed by: STUDENT IN AN ORGANIZED HEALTH CARE EDUCATION/TRAINING PROGRAM

## 2022-08-18 PROCEDURE — 2709999900 HC NON-CHARGEABLE SUPPLY: Performed by: STUDENT IN AN ORGANIZED HEALTH CARE EDUCATION/TRAINING PROGRAM

## 2022-08-18 PROCEDURE — 76040000019: Performed by: STUDENT IN AN ORGANIZED HEALTH CARE EDUCATION/TRAINING PROGRAM

## 2022-08-18 PROCEDURE — 88305 TISSUE EXAM BY PATHOLOGIST: CPT

## 2022-08-18 PROCEDURE — 74011250636 HC RX REV CODE- 250/636: Performed by: NURSE ANESTHETIST, CERTIFIED REGISTERED

## 2022-08-18 PROCEDURE — 00812 ANES LWR INTST SCR COLSC: CPT | Performed by: ANESTHESIOLOGY

## 2022-08-18 PROCEDURE — 74011000250 HC RX REV CODE- 250: Performed by: NURSE ANESTHETIST, CERTIFIED REGISTERED

## 2022-08-18 PROCEDURE — 77030008565 HC TBNG SUC IRR ERBE -B: Performed by: STUDENT IN AN ORGANIZED HEALTH CARE EDUCATION/TRAINING PROGRAM

## 2022-08-18 PROCEDURE — 74011250637 HC RX REV CODE- 250/637: Performed by: STUDENT IN AN ORGANIZED HEALTH CARE EDUCATION/TRAINING PROGRAM

## 2022-08-18 PROCEDURE — 74011250637 HC RX REV CODE- 250/637: Performed by: NURSE ANESTHETIST, CERTIFIED REGISTERED

## 2022-08-18 RX ORDER — SODIUM CHLORIDE 0.9 % (FLUSH) 0.9 %
5-40 SYRINGE (ML) INJECTION AS NEEDED
Status: DISCONTINUED | OUTPATIENT
Start: 2022-08-18 | End: 2022-08-18 | Stop reason: HOSPADM

## 2022-08-18 RX ORDER — SODIUM CHLORIDE 0.9 % (FLUSH) 0.9 %
5-40 SYRINGE (ML) INJECTION EVERY 8 HOURS
Status: CANCELLED | OUTPATIENT
Start: 2022-08-18

## 2022-08-18 RX ORDER — LIDOCAINE HYDROCHLORIDE 10 MG/ML
0.1 INJECTION, SOLUTION EPIDURAL; INFILTRATION; INTRACAUDAL; PERINEURAL AS NEEDED
Status: DISCONTINUED | OUTPATIENT
Start: 2022-08-18 | End: 2022-08-18 | Stop reason: HOSPADM

## 2022-08-18 RX ORDER — PROPOFOL 10 MG/ML
INJECTION, EMULSION INTRAVENOUS AS NEEDED
Status: DISCONTINUED | OUTPATIENT
Start: 2022-08-18 | End: 2022-08-18 | Stop reason: HOSPADM

## 2022-08-18 RX ORDER — SODIUM CHLORIDE 0.9 % (FLUSH) 0.9 %
5-40 SYRINGE (ML) INJECTION AS NEEDED
Status: CANCELLED | OUTPATIENT
Start: 2022-08-18

## 2022-08-18 RX ORDER — DEXTROMETHORPHAN/PSEUDOEPHED 2.5-7.5/.8
DROPS ORAL AS NEEDED
Status: DISCONTINUED | OUTPATIENT
Start: 2022-08-18 | End: 2022-08-18 | Stop reason: HOSPADM

## 2022-08-18 RX ORDER — ONDANSETRON 2 MG/ML
4 INJECTION INTRAMUSCULAR; INTRAVENOUS AS NEEDED
Status: CANCELLED | OUTPATIENT
Start: 2022-08-18

## 2022-08-18 RX ORDER — MAGNESIUM SULFATE 100 %
4 CRYSTALS MISCELLANEOUS AS NEEDED
Status: CANCELLED | OUTPATIENT
Start: 2022-08-18

## 2022-08-18 RX ORDER — SODIUM CHLORIDE, SODIUM LACTATE, POTASSIUM CHLORIDE, CALCIUM CHLORIDE 600; 310; 30; 20 MG/100ML; MG/100ML; MG/100ML; MG/100ML
25 INJECTION, SOLUTION INTRAVENOUS CONTINUOUS
Status: DISCONTINUED | OUTPATIENT
Start: 2022-08-18 | End: 2022-08-18 | Stop reason: HOSPADM

## 2022-08-18 RX ORDER — FAMOTIDINE 20 MG/1
20 TABLET, FILM COATED ORAL ONCE
Status: COMPLETED | OUTPATIENT
Start: 2022-08-18 | End: 2022-08-18

## 2022-08-18 RX ORDER — INSULIN LISPRO 100 [IU]/ML
INJECTION, SOLUTION INTRAVENOUS; SUBCUTANEOUS ONCE
Status: CANCELLED | OUTPATIENT
Start: 2022-08-18 | End: 2022-08-19

## 2022-08-18 RX ORDER — SODIUM CHLORIDE, SODIUM LACTATE, POTASSIUM CHLORIDE, CALCIUM CHLORIDE 600; 310; 30; 20 MG/100ML; MG/100ML; MG/100ML; MG/100ML
75 INJECTION, SOLUTION INTRAVENOUS CONTINUOUS
Status: CANCELLED | OUTPATIENT
Start: 2022-08-18

## 2022-08-18 RX ORDER — LIDOCAINE HYDROCHLORIDE 20 MG/ML
INJECTION, SOLUTION EPIDURAL; INFILTRATION; INTRACAUDAL; PERINEURAL AS NEEDED
Status: DISCONTINUED | OUTPATIENT
Start: 2022-08-18 | End: 2022-08-18 | Stop reason: HOSPADM

## 2022-08-18 RX ORDER — SODIUM CHLORIDE 0.9 % (FLUSH) 0.9 %
5-40 SYRINGE (ML) INJECTION EVERY 8 HOURS
Status: DISCONTINUED | OUTPATIENT
Start: 2022-08-18 | End: 2022-08-18 | Stop reason: HOSPADM

## 2022-08-18 RX ADMIN — PROPOFOL 20 MG: 10 INJECTION, EMULSION INTRAVENOUS at 12:38

## 2022-08-18 RX ADMIN — PROPOFOL 20 MG: 10 INJECTION, EMULSION INTRAVENOUS at 12:40

## 2022-08-18 RX ADMIN — PROPOFOL 50 MG: 10 INJECTION, EMULSION INTRAVENOUS at 12:26

## 2022-08-18 RX ADMIN — PROPOFOL 20 MG: 10 INJECTION, EMULSION INTRAVENOUS at 12:30

## 2022-08-18 RX ADMIN — FAMOTIDINE 20 MG: 20 TABLET ORAL at 11:25

## 2022-08-18 RX ADMIN — PROPOFOL 20 MG: 10 INJECTION, EMULSION INTRAVENOUS at 12:32

## 2022-08-18 RX ADMIN — PROPOFOL 20 MG: 10 INJECTION, EMULSION INTRAVENOUS at 12:36

## 2022-08-18 RX ADMIN — LIDOCAINE HYDROCHLORIDE 40 MG: 20 INJECTION, SOLUTION EPIDURAL; INFILTRATION; INTRACAUDAL; PERINEURAL at 12:26

## 2022-08-18 RX ADMIN — PROPOFOL 20 MG: 10 INJECTION, EMULSION INTRAVENOUS at 12:34

## 2022-08-18 RX ADMIN — SODIUM CHLORIDE, POTASSIUM CHLORIDE, SODIUM LACTATE AND CALCIUM CHLORIDE 25 ML/HR: 600; 310; 30; 20 INJECTION, SOLUTION INTRAVENOUS at 11:43

## 2022-08-18 RX ADMIN — PROPOFOL 30 MG: 10 INJECTION, EMULSION INTRAVENOUS at 12:28

## 2022-08-18 NOTE — ANESTHESIA POSTPROCEDURE EVALUATION
Procedure(s):  COLONOSCOPY/ Polypectomy.     MAC    Anesthesia Post Evaluation      Multimodal analgesia: multimodal analgesia used between 6 hours prior to anesthesia start to PACU discharge  Patient location during evaluation: bedside  Patient participation: complete - patient participated  Level of consciousness: awake  Pain management: adequate  Airway patency: patent  Anesthetic complications: no  Cardiovascular status: stable  Respiratory status: acceptable  Hydration status: acceptable  Post anesthesia nausea and vomiting:  controlled      INITIAL Post-op Vital signs:   Vitals Value Taken Time   /72 08/18/22 1349   Temp 36.2 °C (97.2 °F) 08/18/22 1349   Pulse 64 08/18/22 1349   Resp 20 08/18/22 1349   SpO2 96 % 08/18/22 1349

## 2022-08-18 NOTE — DISCHARGE INSTRUCTIONS
DISCHARGE SUMMARY from Nurse    PATIENT INSTRUCTIONS:    After general anesthesia or intravenous sedation, for 24 hours or while taking prescription Narcotics:  Limit your activities  Do not drive and operate hazardous machinery  Do not make important personal or business decisions  Do  not drink alcoholic beverages  If you have not urinated within 8 hours after discharge, please contact your surgeon on call. These are general instructions for a healthy lifestyle:    No smoking/ No tobacco products/ Avoid exposure to second hand smoke  Surgeon General's Warning:  Quitting smoking now greatly reduces serious risk to your health. Obesity, smoking, and sedentary lifestyle greatly increases your risk for illness    A healthy diet, regular physical exercise & weight monitoring are important for maintaining a healthy lifestyle    You may be retaining fluid if you have a history of heart failure or if you experience any of the following symptoms:  Weight gain of 3 pounds or more overnight or 5 pounds in a week, increased swelling in our hands or feet or shortness of breath while lying flat in bed. Please call your doctor as soon as you notice any of these symptoms; do not wait until your next office visit. The discharge information has been reviewed with the {PATIENT PARENT GUARDIAN:44946}. The {PATIENT PARENT GUARDIAN:17511} verbalized understanding. Discharge medications reviewed with the {Dishcarge meds reviewed RPEU:08216} and appropriate educational materials and side effects teaching were provided.   ___________________________________________________________________________________________________________________________________

## 2022-08-18 NOTE — H&P
[email or web address]  [phone]    Chief Complaint: CRCS    History of present illness: CRCS    PMH:   Past Medical History:   Diagnosis Date    Anxiety     Depression     medication briefly a few years ago    Hypertension     previously on medication, stopped it on own about a year ago    Migraine     prn excedrine migraine    Pap smear about 10 years ago    no abnormals    Prediabetes 5/2015    Screening mammogram last about 2007    Vitamin D deficiency      Allergies: No Known Allergies  Medications:   Current Facility-Administered Medications:     lidocaine (PF) (XYLOCAINE) 10 mg/mL (1 %) injection 0.1 mL, 0.1 mL, SubCUTAneous, PRN, Brain Few, CRNA    lactated Ringers infusion, 25 mL/hr, IntraVENous, CONTINUOUS, Brain Few, CRNA    sodium chloride (NS) flush 5-40 mL, 5-40 mL, IntraVENous, Q8H, Battani, Soni, CRNA    sodium chloride (NS) flush 5-40 mL, 5-40 mL, IntraVENous, PRN, Brain Few, CRNA  FH:   Family History   Problem Relation Age of Onset    Cancer Mother         non-hodgkins     Cancer Sister 28        breast    Breast Cancer Sister 39    Breast Cancer Maternal Grandmother      Social:   Social History     Socioeconomic History    Marital status:    Tobacco Use    Smoking status: Never    Smokeless tobacco: Never   Vaping Use    Vaping Use: Never used   Substance and Sexual Activity    Alcohol use: No    Drug use: No    Sexual activity: Not Currently     Partners: Male     Birth control/protection: None     Comment: not for past 2 years     Surgical H: History reviewed. No pertinent surgical history. ROS: negative    Physical Exam: Visit Vitals  Ht 5' 3\" (1.6 m)   Wt 85.7 kg (189 lb)   BMI 33.48 kg/m²     General appearance: alert, no distress  Eyes: pupils equal and reactive, extraocular eye movements intact  Nodes: No gross adenopathy in neck.   Skin: no spider angiomata, jaundice, palmar erythema   Respiratory: clear to auscultation bilaterally  Cardiovascular: regular heart rate, no murmurs, no JVD, normal rate and regular rhythm  Abdomen: soft, non-tender, liver not enlarged, spleen not palpable, no obvious ascites  Extremities: no muscle wasting, no gross arthritic changes  Neurologic: alert and oriented, cranial nerves grossly intact, no asterixis    Imp/ Plan: Will proceed with colonoscopy as planned. Risk benefits alternative including but not limited to infection, bleeding, perforation of viscous, allergic reaction and resultant morbidity and mortality was discussed. Chance of missing a significant lesion due to various reasons were discussed.     Paolo Wall MD   [facility]

## 2022-08-18 NOTE — ANESTHESIA PREPROCEDURE EVALUATION
Relevant Problems   NEUROLOGY   (+) Depression, major, recurrent, mild (HCC)   (+) Episodic tension-type headache, not intractable   (+) Migraine   (+) Recurrent depression (HCC)       Anesthetic History   No history of anesthetic complications            Review of Systems / Medical History  Patient summary reviewed and pertinent labs reviewed    Pulmonary                   Neuro/Psych         Psychiatric history     Cardiovascular    Hypertension                   GI/Hepatic/Renal                Endo/Other             Other Findings              Physical Exam    Airway  Mallampati: II  TM Distance: 4 - 6 cm  Neck ROM: normal range of motion   Mouth opening: Normal     Cardiovascular    Rhythm: regular  Rate: normal         Dental    Dentition: Poor dentition     Pulmonary  Breath sounds clear to auscultation               Abdominal  GI exam deferred       Other Findings            Anesthetic Plan    ASA: 2  Anesthesia type: MAC            Anesthetic plan and risks discussed with: Patient

## 2022-12-02 ENCOUNTER — HOSPITAL ENCOUNTER (OUTPATIENT)
Dept: LAB | Age: 57
Discharge: HOME | End: 2022-12-02
Payer: COMMERCIAL

## 2022-12-02 DIAGNOSIS — E78.9 BORDERLINE HIGH CHOLESTEROL: ICD-10-CM

## 2022-12-02 DIAGNOSIS — E55.9 VITAMIN D DEFICIENCY: ICD-10-CM

## 2022-12-02 LAB
25(OH)D3 SERPL-MCNC: 23.2 NG/ML (ref 30–100)
ALBUMIN SERPL-MCNC: 4.3 G/DL (ref 3.4–5)
ALBUMIN/GLOB SERPL: 1.3 {RATIO} (ref 0.8–1.7)
ALP SERPL-CCNC: 81 U/L (ref 45–117)
ALT SERPL-CCNC: 29 U/L (ref 13–56)
ANION GAP SERPL CALC-SCNC: 3 MMOL/L (ref 3–18)
AST SERPL-CCNC: 14 U/L (ref 10–38)
BILIRUB SERPL-MCNC: 0.9 MG/DL (ref 0.2–1)
BUN SERPL-MCNC: 19 MG/DL (ref 7–18)
BUN/CREAT SERPL: 26 (ref 12–20)
CALCIUM SERPL-MCNC: 9.9 MG/DL (ref 8.5–10.1)
CHLORIDE SERPL-SCNC: 106 MMOL/L (ref 100–111)
CHOLEST SERPL-MCNC: 244 MG/DL
CO2 SERPL-SCNC: 30 MMOL/L (ref 21–32)
CREAT SERPL-MCNC: 0.74 MG/DL (ref 0.6–1.3)
GLOBULIN SER CALC-MCNC: 3.4 G/DL (ref 2–4)
GLUCOSE SERPL-MCNC: 103 MG/DL (ref 74–99)
HDLC SERPL-MCNC: 60 MG/DL (ref 40–60)
HDLC SERPL: 4.1 {RATIO} (ref 0–5)
LDLC SERPL CALC-MCNC: 164.6 MG/DL (ref 0–100)
LIPID PROFILE,FLP: ABNORMAL
POTASSIUM SERPL-SCNC: 4.5 MMOL/L (ref 3.5–5.5)
PROT SERPL-MCNC: 7.7 G/DL (ref 6.4–8.2)
SODIUM SERPL-SCNC: 139 MMOL/L (ref 136–145)
TRIGL SERPL-MCNC: 97 MG/DL (ref ?–150)
VLDLC SERPL CALC-MCNC: 19.4 MG/DL

## 2022-12-02 PROCEDURE — 80053 COMPREHEN METABOLIC PANEL: CPT

## 2022-12-02 PROCEDURE — 80061 LIPID PANEL: CPT

## 2022-12-02 PROCEDURE — 82306 VITAMIN D 25 HYDROXY: CPT

## 2022-12-02 PROCEDURE — 36415 COLL VENOUS BLD VENIPUNCTURE: CPT

## 2022-12-05 NOTE — PROGRESS NOTES
1. \"Have you been to the ER, urgent care clinic since your last visit? Hospitalized since your last visit? \" {Yes when where and reason for visit:20441}    2. \"Have you seen or consulted any other health care providers outside of the [address] [place] [address] since your last visit? \" {Yes when where and reason for visit:20441}     3. For patients aged 39-70: Has the patient had a colonoscopy / FIT/ Cologuard? Yes - no Care Gap present      If the patient is female:    4. For patients aged 41-77: Has the patient had a mammogram within the past 2 years? Yes - no Care Gap present due 12/30/2022      5. For patients aged 21-65: Has the patient had a pap smear?  Yes - no Care Gap present

## 2022-12-06 ENCOUNTER — OFFICE VISIT (OUTPATIENT)
Dept: FAMILY MEDICINE CLINIC | Age: 57
End: 2022-12-06
Payer: COMMERCIAL

## 2022-12-06 VITALS
SYSTOLIC BLOOD PRESSURE: 108 MMHG | WEIGHT: 189 LBS | DIASTOLIC BLOOD PRESSURE: 76 MMHG | BODY MASS INDEX: 33.49 KG/M2 | HEIGHT: 63 IN | RESPIRATION RATE: 18 BRPM | TEMPERATURE: 98 F | HEART RATE: 78 BPM | OXYGEN SATURATION: 98 %

## 2022-12-06 DIAGNOSIS — R73.03 PREDIABETES: ICD-10-CM

## 2022-12-06 DIAGNOSIS — E78.9 BORDERLINE HIGH CHOLESTEROL: Primary | ICD-10-CM

## 2022-12-06 DIAGNOSIS — G44.219 EPISODIC TENSION-TYPE HEADACHE, NOT INTRACTABLE: ICD-10-CM

## 2022-12-06 DIAGNOSIS — E55.9 VITAMIN D DEFICIENCY: ICD-10-CM

## 2022-12-06 DIAGNOSIS — Z12.31 BREAST CANCER SCREENING BY MAMMOGRAM: ICD-10-CM

## 2022-12-06 DIAGNOSIS — F33.0 DEPRESSION, MAJOR, RECURRENT, MILD (HCC): ICD-10-CM

## 2022-12-06 PROCEDURE — 99214 OFFICE O/P EST MOD 30 MIN: CPT | Performed by: FAMILY MEDICINE

## 2022-12-06 RX ORDER — ERGOCALCIFEROL 1.25 MG/1
50000 CAPSULE ORAL
Qty: 12 CAPSULE | Refills: 0 | Status: SHIPPED | OUTPATIENT
Start: 2022-12-06

## 2022-12-06 RX ORDER — SUMATRIPTAN 25 MG/1
25 TABLET, FILM COATED ORAL
COMMUNITY
End: 2022-12-06 | Stop reason: SDUPTHER

## 2022-12-06 RX ORDER — SUMATRIPTAN 25 MG/1
25 TABLET, FILM COATED ORAL
Qty: 12 TABLET | Refills: 1 | Status: SHIPPED | OUTPATIENT
Start: 2022-12-06 | End: 2022-12-06

## 2022-12-06 NOTE — PROGRESS NOTES
Anita Mcelroy, 61 y.o.,  female    SUBJECTIVE  Ff-up      Depression-/migraine headaches reports to be doing well, continues to take cymbalta, finding this helpful with headaches, infrequent flares. Takes prn imitrex    Vitamin d def- reviewed labs    Prediabetes/ HL- reviewed CV risk factors, father with CAD his late 66's. She reports regular soda consumption    ROS:  See HPI, all others negative        Patient Active Problem List   Diagnosis Code    Recurrent depression ([facility] [address] F33.9    Migraine G43.909    Vitamin D deficiency E55.9    Prediabetes R73.03    Colon cancer screening Z12.11    History of hypertension Z86.79    Depression, major, recurrent, mild (HCC) F33.0    Episodic tension-type headache, not intractable G44.219       Current Outpatient Medications   Medication Sig Dispense Refill    SUMAtriptan (IMITREX) 25 mg tablet Take 1 Tablet by mouth once as needed for Migraine for up to 1 dose. Take 1 Tablet by mouth once as needed for Migraine for up to 1 dose. 12 Tablet 1    ergocalciferol (ERGOCALCIFEROL) 1,250 mcg (50,000 unit) capsule Take 1 Capsule by mouth every seven (7) days. 12 Capsule 0    DULoxetine (CYMBALTA) 30 mg capsule Take 1 Capsule by mouth daily. 90 Capsule 0    aspirin/acetaminophen/caffeine (EXCEDRIN MIGRAINE PO) Take  by mouth as needed.          No Known Allergies    Past Medical History:   Diagnosis Date    Anxiety     Depression     medication briefly a few years ago    Hypertension     previously on medication, stopped it on own about a year ago    Migraine     prn excedrine migraine    Pap smear about 10 years ago    no abnormals    Prediabetes 5/2015    Screening mammogram last about 2007    Vitamin D deficiency        Social History     Socioeconomic History    Marital status:      Spouse name: Not on file    Number of children: Not on file    Years of education: Not on file    Highest education level: Not on file   Occupational History    Not on file   Tobacco Use Smoking status: Never    Smokeless tobacco: Never   Vaping Use    Vaping Use: Never used   Substance and Sexual Activity    Alcohol use: No    Drug use: Never    Sexual activity: Not Currently     Partners: Male     Birth control/protection: None     Comment: not for past 2 years   Other Topics Concern    Not on file   Social History Narrative    Not on file     Social Determinants of Health     Financial Resource Strain: Low Risk     Difficulty of Paying Living Expenses: Not hard at all   Food Insecurity: No Food Insecurity    Worried About Running Out of Food in the Last Year: Never true    Ran Out of Food in the Last Year: Never true   Transportation Needs: Not on file   Physical Activity: Not on file   Stress: Not on file   Social Connections: Not on file   Intimate Partner Violence: Not on file   Housing Stability: Not on file       Family History   Problem Relation Age of Onset    Cancer Mother         non-hodgkins     Cancer Sister 28        breast    Breast Cancer Sister 39    Breast Cancer Maternal Grandmother          OBJECTIVE    Physical Exam:     Visit Vitals  /76 (BP 1 Location: Left arm, BP Patient Position: Sitting, BP Cuff Size: Large adult)   Pulse 78   Temp 98 °F (36.7 °C) (Temporal)   Resp 18   Ht 5' 3\" (1.6 m)   Wt 189 lb (85.7 kg)   SpO2 98%   BMI 33.48 kg/m²       General: alert, well-appearing,  in no apparent distress or pain  Neck: supple, no adenopathy palpated  CVS: normal rate, regular rhythm, distinct S1 and S2  Lungs:clear to ausculation bilaterally, no crackles, wheezing or rhonchi noted  Abdomen: normoactive bowel sounds, soft, non-tender  Extremities: no edema, no cyanosis, MSK grossly normal  Skin: warm, no lesions, rashes noted  Psych:  mood and affect normal  Results for orders placed or performed during the hospital encounter of 12/02/22   VITAMIN D, 25 HYDROXY   Result Value Ref Range    Vitamin D 25-Hydroxy 23.2 (L) 30 - 887 ng/mL   METABOLIC PANEL, COMPREHENSIVE Result Value Ref Range    Sodium 139 136 - 145 mmol/L    Potassium 4.5 3.5 - 5.5 mmol/L    Chloride 106 100 - 111 mmol/L    CO2 30 21 - 32 mmol/L    Anion gap 3 3.0 - 18 mmol/L    Glucose 103 (H) 74 - 99 mg/dL    BUN 19 (H) 7.0 - 18 MG/DL    Creatinine 0.74 0.6 - 1.3 MG/DL    BUN/Creatinine ratio 26 (H) 12 - 20      eGFR >60 >60 ml/min/1.73m2    Calcium 9.9 8.5 - 10.1 MG/DL    Bilirubin, total 0.9 0.2 - 1.0 MG/DL    ALT (SGPT) 29 13 - 56 U/L    AST (SGOT) 14 10 - 38 U/L    Alk. phosphatase 81 45 - 117 U/L    Protein, total 7.7 6.4 - 8.2 g/dL    Albumin 4.3 3.4 - 5.0 g/dL    Globulin 3.4 2.0 - 4.0 g/dL    A-G Ratio 1.3 0.8 - 1.7     LIPID PANEL   Result Value Ref Range    LIPID PROFILE          Cholesterol, total 244 (H) <200 MG/DL    Triglyceride 97 <150 MG/DL    HDL Cholesterol 60 40 - 60 MG/DL    LDL, calculated 164.6 (H) 0 - 100 MG/DL    VLDL, calculated 19.4 MG/DL    CHOL/HDL Ratio 4.1 0 - 5.0             ASSESSMENT/PLAN  Diagnoses and all orders for this visit:    1. Borderline high cholesterol  Calc cv risk 2.2% vs 1.9% without accounting for prediabetes  We discussed lifestyle modifications  -     LIPID PANEL; Future    2. Breast cancer screening by mammogram  -     [facility] 3D AMBROCIO W MAMMO BI SCREENING INCL CAD; Future    3. Prediabetes  Tlcs, monitoring  -     METABOLIC PANEL, COMPREHENSIVE; Future  -     HEMOGLOBIN A1C WITH EAG; Future    4. Vitamin D deficiency  Replete  -     VITAMIN D, 25 HYDROXY; Future    5. Episodic tension-type headache, not intractable  Stable  Cont cymbalta, prn imitrex    6. Depression, major, recurrent, mild (HCC)  Stable  Cont cymbalta    Other orders  -     SUMAtriptan (IMITREX) 25 mg tablet; Take 1 Tablet by mouth once as needed for Migraine for up to 1 dose. Take 1 Tablet by mouth once as needed for Migraine for up to 1 dose. -     ergocalciferol (ERGOCALCIFEROL) 1,250 mcg (50,000 unit) capsule; Take 1 Capsule by mouth every seven (7) days.       Follow-up and Dispositions Return in about 3 months (around 3/6/2023), or if symptoms worsen or fail to improve, for routine chronic illness care, fasting labs a week prior to your next visit. Patient understands plan of care. Patient has provided input and agrees with goals.

## 2023-01-13 ENCOUNTER — HOSPITAL ENCOUNTER (OUTPATIENT)
Dept: MAMMOGRAPHY | Age: 58
Discharge: HOME | End: 2023-01-13
Attending: FAMILY MEDICINE
Payer: COMMERCIAL

## 2023-01-13 DIAGNOSIS — Z12.31 BREAST CANCER SCREENING BY MAMMOGRAM: ICD-10-CM

## 2023-01-13 PROCEDURE — 77063 BREAST TOMOSYNTHESIS BI: CPT

## 2023-03-13 NOTE — PROGRESS NOTES
Chief Complaint   Patient presents with    Cholesterol Problem    Depression    Migraine     Hx of Pre-DM and vitamin D def. Results     Review of results         1. \"Have you been to the ER, urgent care clinic since your last visit? Hospitalized since your last visit? \" No    2. \"Have you seen or consulted any other health care providers outside of the [address] [place] [address] since your last visit? \" No     3. For patients aged 39-70: Has the patient had a colonoscopy / FIT/ Cologuard? Yes - no Care Gap present      If the patient is female:    4. For patients aged 41-77: Has the patient had a mammogram within the past 2 years? Yes - no Care Gap present      5. For patients aged 21-65: Has the patient had a pap smear?  Yes - no Care Gap present

## 2023-03-14 ENCOUNTER — HOSPITAL ENCOUNTER (OUTPATIENT)
Facility: HOSPITAL | Age: 58
Discharge: HOME | End: 2023-03-17
Payer: COMMERCIAL

## 2023-03-14 LAB
25(OH)D3 SERPL-MCNC: 36.1 NG/ML (ref 30–100)
ALBUMIN SERPL-MCNC: 4.2 G/DL (ref 3.4–5)
ALBUMIN/GLOB SERPL: 1.2 (ref 0.8–1.7)
ALP SERPL-CCNC: 79 U/L (ref 45–117)
ALT SERPL-CCNC: 25 U/L (ref 13–56)
ANION GAP SERPL CALC-SCNC: 3 MMOL/L (ref 3–18)
AST SERPL-CCNC: 15 U/L (ref 10–38)
BILIRUB SERPL-MCNC: 0.8 MG/DL (ref 0.2–1)
BUN SERPL-MCNC: 18 MG/DL (ref 7–18)
BUN/CREAT SERPL: 25 (ref 12–20)
CALCIUM SERPL-MCNC: 10 MG/DL (ref 8.5–10.1)
CHLORIDE SERPL-SCNC: 105 MMOL/L (ref 100–111)
CHOLEST SERPL-MCNC: 243 MG/DL
CO2 SERPL-SCNC: 31 MMOL/L (ref 21–32)
CREAT SERPL-MCNC: 0.71 MG/DL (ref 0.6–1.3)
EST. AVERAGE GLUCOSE BLD GHB EST-MCNC: 111 MG/DL
GLOBULIN SER CALC-MCNC: 3.6 G/DL (ref 2–4)
GLUCOSE SERPL-MCNC: 102 MG/DL (ref 74–99)
HBA1C MFR BLD: 5.5 % (ref 4.2–5.6)
HDLC SERPL-MCNC: 58 MG/DL (ref 40–60)
HDLC SERPL: 4.2 (ref 0–5)
LDLC SERPL CALC-MCNC: 160.8 MG/DL (ref 0–100)
LIPID PANEL: ABNORMAL
POTASSIUM SERPL-SCNC: 4.5 MMOL/L (ref 3.5–5.5)
PROT SERPL-MCNC: 7.8 G/DL (ref 6.4–8.2)
SODIUM SERPL-SCNC: 139 MMOL/L (ref 136–145)
TRIGL SERPL-MCNC: 121 MG/DL
VLDLC SERPL CALC-MCNC: 24.2 MG/DL

## 2023-03-14 PROCEDURE — 83036 HEMOGLOBIN GLYCOSYLATED A1C: CPT

## 2023-03-14 PROCEDURE — 80053 COMPREHEN METABOLIC PANEL: CPT

## 2023-03-14 PROCEDURE — 82306 VITAMIN D 25 HYDROXY: CPT

## 2023-03-14 PROCEDURE — 80061 LIPID PANEL: CPT

## 2023-03-14 PROCEDURE — 36415 COLL VENOUS BLD VENIPUNCTURE: CPT

## 2023-03-16 ENCOUNTER — OFFICE VISIT (OUTPATIENT)
Age: 58
End: 2023-03-16
Payer: COMMERCIAL

## 2023-03-16 VITALS
BODY MASS INDEX: 35.26 KG/M2 | WEIGHT: 199 LBS | DIASTOLIC BLOOD PRESSURE: 74 MMHG | RESPIRATION RATE: 16 BRPM | TEMPERATURE: 98.4 F | HEIGHT: 63 IN | HEART RATE: 75 BPM | OXYGEN SATURATION: 98 % | SYSTOLIC BLOOD PRESSURE: 106 MMHG

## 2023-03-16 DIAGNOSIS — G43.009 MIGRAINE WITHOUT AURA AND WITHOUT STATUS MIGRAINOSUS, NOT INTRACTABLE: ICD-10-CM

## 2023-03-16 DIAGNOSIS — F33.0 MAJOR DEPRESSIVE DISORDER, RECURRENT, MILD (HCC): ICD-10-CM

## 2023-03-16 DIAGNOSIS — E55.9 VITAMIN D DEFICIENCY: ICD-10-CM

## 2023-03-16 DIAGNOSIS — E66.01 SEVERE OBESITY (BMI 35.0-39.9) WITH COMORBIDITY (HCC): ICD-10-CM

## 2023-03-16 DIAGNOSIS — G44.219 EPISODIC TENSION-TYPE HEADACHE, NOT INTRACTABLE: ICD-10-CM

## 2023-03-16 DIAGNOSIS — E78.00 PURE HYPERCHOLESTEROLEMIA: Primary | ICD-10-CM

## 2023-03-16 DIAGNOSIS — R73.03 PREDIABETES: ICD-10-CM

## 2023-03-16 PROCEDURE — 99214 OFFICE O/P EST MOD 30 MIN: CPT | Performed by: FAMILY MEDICINE

## 2023-03-16 RX ORDER — PRAVASTATIN SODIUM 20 MG
20 TABLET ORAL EVERY EVENING
Qty: 90 TABLET | Refills: 0 | Status: SHIPPED | OUTPATIENT
Start: 2023-03-16

## 2023-03-16 RX ORDER — ACETAMINOPHEN, ASPIRIN AND CAFFEINE 250; 250; 65 MG/1; MG/1; MG/1
TABLET, FILM COATED ORAL PRN
COMMUNITY

## 2023-03-16 SDOH — ECONOMIC STABILITY: INCOME INSECURITY: HOW HARD IS IT FOR YOU TO PAY FOR THE VERY BASICS LIKE FOOD, HOUSING, MEDICAL CARE, AND HEATING?: NOT VERY HARD

## 2023-03-16 SDOH — ECONOMIC STABILITY: FOOD INSECURITY: WITHIN THE PAST 12 MONTHS, THE FOOD YOU BOUGHT JUST DIDN'T LAST AND YOU DIDN'T HAVE MONEY TO GET MORE.: NEVER TRUE

## 2023-03-16 SDOH — ECONOMIC STABILITY: HOUSING INSECURITY
IN THE LAST 12 MONTHS, WAS THERE A TIME WHEN YOU DID NOT HAVE A STEADY PLACE TO SLEEP OR SLEPT IN A SHELTER (INCLUDING NOW)?: NO

## 2023-03-16 SDOH — ECONOMIC STABILITY: FOOD INSECURITY: WITHIN THE PAST 12 MONTHS, YOU WORRIED THAT YOUR FOOD WOULD RUN OUT BEFORE YOU GOT MONEY TO BUY MORE.: NEVER TRUE

## 2023-03-16 ASSESSMENT — PATIENT HEALTH QUESTIONNAIRE - PHQ9
SUM OF ALL RESPONSES TO PHQ9 QUESTIONS 1 & 2: 1
SUM OF ALL RESPONSES TO PHQ QUESTIONS 1-9: 2
5. POOR APPETITE OR OVEREATING: 0
SUM OF ALL RESPONSES TO PHQ QUESTIONS 1-9: 2
SUM OF ALL RESPONSES TO PHQ QUESTIONS 1-9: 2
4. FEELING TIRED OR HAVING LITTLE ENERGY: 0
1. LITTLE INTEREST OR PLEASURE IN DOING THINGS: 0
3. TROUBLE FALLING OR STAYING ASLEEP: 0
8. MOVING OR SPEAKING SO SLOWLY THAT OTHER PEOPLE COULD HAVE NOTICED. OR THE OPPOSITE, BEING SO FIGETY OR RESTLESS THAT YOU HAVE BEEN MOVING AROUND A LOT MORE THAN USUAL: 0
6. FEELING BAD ABOUT YOURSELF - OR THAT YOU ARE A FAILURE OR HAVE LET YOURSELF OR YOUR FAMILY DOWN: 0
SUM OF ALL RESPONSES TO PHQ QUESTIONS 1-9: 2
9. THOUGHTS THAT YOU WOULD BE BETTER OFF DEAD, OR OF HURTING YOURSELF: 0
2. FEELING DOWN, DEPRESSED OR HOPELESS: 1
DEPRESSION UNABLE TO ASSESS: FUNCTIONAL CAPACITY MOTIVATION LIMITS ACCURACY
7. TROUBLE CONCENTRATING ON THINGS, SUCH AS READING THE NEWSPAPER OR WATCHING TELEVISION: 1

## 2023-03-16 NOTE — PROGRESS NOTES
Sana Luevano, 61 y.o.,  female    SUBJECTIVE  Ff-up    Depression-/migraine headaches reports to be doing well, continues to take cymbalta, finding this helpful with headaches, infrequent flares. Takes prn imitrex     Vitamin d def- on her last 2 weeks of 50k weekly, reviewed now wnl     Prediabetes/ HL- reviewed CV risk factors, father with CAD his late 66's. She reports regular soda consumption    ROS:  See HPI, all others negative        Patient Active Problem List   Diagnosis    History of hypertension    Migraine    Colon cancer screening    Vitamin D deficiency    Prediabetes    Depression, major, recurrent, mild (HCC)    Episodic tension-type headache, not intractable    Recurrent depression (HCC)       Current Outpatient Medications   Medication Sig Dispense Refill    aspirin-acetaminophen-caffeine (EXCEDRIN MIGRAINE) 250-250-65 MG per tablet Take by mouth as needed      pravastatin (PRAVACHOL) 20 MG tablet Take 1 tablet by mouth every evening 90 tablet 0    DULoxetine (CYMBALTA) 30 MG extended release capsule Take 30 mg by mouth daily      ergocalciferol (ERGOCALCIFEROL) 1.25 MG (60539 UT) capsule Take 50,000 Units by mouth every 7 days       No current facility-administered medications for this visit.        No Known Allergies    Past Medical History:   Diagnosis Date    Anxiety     Depression     medication briefly a few years ago    Hypertension     previously on medication, stopped it on own about a year ago    Migraine     prn excedrine migraine    Prediabetes 5/2015    Vitamin D deficiency        Social History     Socioeconomic History    Marital status:      Spouse name: Not on file    Number of children: Not on file    Years of education: Not on file    Highest education level: Not on file   Occupational History    Not on file   Tobacco Use    Smoking status: Never    Smokeless tobacco: Never   Substance and Sexual Activity    Alcohol use: No    Drug use: Never    Sexual activity: Not on file     Comment: not for past 2 years   Other Topics Concern    Not on file   Social History Narrative    Not on file     Social Determinants of Health     Financial Resource Strain: Low Risk     Difficulty of Paying Living Expenses: Not very hard   Food Insecurity: No Food Insecurity    Worried About Running Out of Food in the Last Year: Never true    Ran Out of Food in the Last Year: Never true   Transportation Needs: Unknown    Lack of Transportation (Medical): Not on file    Lack of Transportation (Non-Medical):  No   Physical Activity: Not on file   Stress: Not on file   Social Connections: Not on file   Intimate Partner Violence: Not on file   Housing Stability: Unknown    Unable to Pay for Housing in the Last Year: Not on file    Number of Places Lived in the Last Year: Not on file    Unstable Housing in the Last Year: No       Family History   Problem Relation Age of Onset    Cancer Sister 28        breast    Breast Cancer Maternal Grandmother     Breast Cancer Sister 39    Cancer Mother         non-hodgkins          OBJECTIVE    Physical Exam:     /74 (Site: Left Upper Arm, Position: Sitting, Cuff Size: Large Adult)   Pulse 75   Temp 98.4 °F (36.9 °C) (Temporal)   Resp 16   Ht 5' 3\" (1.6 m)   Wt 199 lb (90.3 kg)   SpO2 98%   BMI 35.25 kg/m²     General: alert, well-appearing,  in no apparent distress or pain  Neck: supple, no adenopathy palpated  CVS: normal rate, regular rhythm, distinct S1 and S2  Lungs:clear to ausculation bilaterally, no crackles, wheezing or rhonchi noted  Abdomen: normoactive bowel sounds, soft, non-tender  Extremities: no edema, no cyanosis, MSK grossly normal  Skin: warm, no lesions, rashes noted  Psych:  mood and affect normal    CMP:   Lab Results   Component Value Date/Time     03/14/2023 09:27 AM    K 4.5 03/14/2023 09:27 AM     03/14/2023 09:27 AM    CO2 31 03/14/2023 09:27 AM    BUN 18 03/14/2023 09:27 AM    CREATININE 0.71 03/14/2023 09:27 AM GLUCOSE 102 03/14/2023 09:27 AM    CALCIUM 10.0 03/14/2023 09:27 AM    PROT 7.8 03/14/2023 09:27 AM    LABALBU 4.2 03/14/2023 09:27 AM    BILITOT 0.8 03/14/2023 09:27 AM    AST 15 03/14/2023 09:27 AM    ALT 25 03/14/2023 09:27 AM        CBC:   Lab Results   Component Value Date/Time    WBC 4.2 02/25/2022 09:30 AM    RBC 5.26 02/25/2022 09:30 AM    HGB 14.7 02/25/2022 09:30 AM    HCT 45.7 02/25/2022 09:30 AM    MCV 86.9 02/25/2022 09:30 AM    MCH 27.9 02/25/2022 09:30 AM    MCHC 32.2 02/25/2022 09:30 AM    RDW 12.3 02/25/2022 09:30 AM     02/25/2022 09:30 AM    MPV 10.7 02/25/2022 09:30 AM        Lipids   Lab Results   Component Value Date/Time    CHOL 243 03/14/2023 09:27 AM    TRIG 121 03/14/2023 09:27 AM    LDLCALC 160.8 03/14/2023 09:27 AM    LABVLDL 24.2 03/14/2023 09:27 AM    CHOLHDLRATIO 4.2 03/14/2023 09:27 AM         Imaging results last 24 hrs :No results found. Imaging results impression onlyNo results found. No orders to display       A1c:   Hemoglobin A1C   Date Value Ref Range Status   03/14/2023 5.5 4.2 - 5.6 % Final     Comment:     (NOTE)  HbA1C Interpretive Ranges  <5.7              Normal  5.7 - 6.4         Consider Prediabetes  >6.5              Consider Diabetes     02/25/2022 5.5 4.2 - 5.6 % Final     Comment:     (NOTE)  HbA1C Interpretive Ranges  <5.7              Normal  5.7 - 6.4         Consider Prediabetes  >6.5              Consider Diabetes     04/14/2020 5.5 4.2 - 5.6 % Final     Comment:     (NOTE)  HbA1C Interpretive Ranges  <5.7              Normal  5.7 - 6.4         Consider Prediabetes  >6.5              Consider Diabetes           ASSESSMENT/PLAN  Jeni Dueñas was seen today for cholesterol problem, depression, migraine and results.     Diagnoses and all orders for this visit:    Pure hypercholesterolemia  Calc cv risk 2.3% vs 1.9%, w/ fam hx CAD  We discussed options, agreed on starting mod intensity statin  Start pravachol 20 mg qhs  -     Comprehensive Metabolic Panel; Future  -     Lipid Panel; Future    Vitamin D deficiency  Reduce to 1000 iu daily  Monitoring  -     Vitamin D 25 Hydroxy; Future    Migraine without aura and without status migrainosus, not intractable  Stable, cont cymbalta, prn imitrex    Prediabetes  Tlcs, monitoring    Major depressive disorder, recurrent, mild (HCC)  Stable  Cont cymbalta    Severe obesity (BMI 35.0-39. 9) with comorbidity ([address]  Encouraged wt loss    Episodic tension-type headache, not intractable  Stable    Other orders  -     pravastatin (PRAVACHOL) 20 MG tablet; Take 1 tablet by mouth every evening      Ff-up in 3 months or sooner prn. Plan on [address] then      Patient understands plan of care. Patient has provided input and agrees with goals.

## 2023-03-29 NOTE — PROGRESS NOTES
Cholesterol and DM screen are wnl range. pls notify pt.
Patient identified with 2 identifiers (name and ).   Patient aware of normal labs
[FreeTextEntry1] : I spent the time noted on the day of this patient encounter preparing for, providing and documenting the above service. I have  counseled and educated the patient on the differential, workup, disease course, and treatment/management plan. Education was provided to the patient during this encounter. All questions and concerns were answered and addressed in detail.\par \par Lorraine Kaba MD\par

## 2023-07-06 NOTE — PROGRESS NOTES
Chief Complaint   Patient presents with    Annual Exam     With pap smear       1. \"Have you been to the ER, urgent care clinic since your last visit? Hospitalized since your last visit? \" No    2. \"Have you seen or consulted any other health care providers outside of the [address] [place] [address] since your last visit? \" No     3. For patients aged 43-73: Has the patient had a colonoscopy / FIT/ Cologuard? Yes - no Care Gap present due 08/18/2027      If the patient is female:    4. For patients aged 43-66: Has the patient had a mammogram within the past 2 years? Yes - no Care Gap present due 01/13/2024      5. For patients aged 21-65: Has the patient had a pap smear? Yes - Care Gap present.  Most recent result on file due 03/19/2023

## 2023-07-07 ENCOUNTER — OFFICE VISIT (OUTPATIENT)
Age: 58
End: 2023-07-07
Payer: COMMERCIAL

## 2023-07-07 ENCOUNTER — HOSPITAL ENCOUNTER (OUTPATIENT)
Facility: HOSPITAL | Age: 58
Setting detail: SPECIMEN
Discharge: HOME | End: 2023-07-10
Payer: COMMERCIAL

## 2023-07-07 VITALS
RESPIRATION RATE: 16 BRPM | DIASTOLIC BLOOD PRESSURE: 72 MMHG | OXYGEN SATURATION: 98 % | WEIGHT: 192 LBS | SYSTOLIC BLOOD PRESSURE: 118 MMHG | BODY MASS INDEX: 34.02 KG/M2 | HEART RATE: 76 BPM | TEMPERATURE: 98.1 F | HEIGHT: 63 IN

## 2023-07-07 DIAGNOSIS — E78.2 MIXED HYPERLIPIDEMIA: ICD-10-CM

## 2023-07-07 DIAGNOSIS — Z01.419 WELL WOMAN EXAM WITH ROUTINE GYNECOLOGICAL EXAM: Primary | ICD-10-CM

## 2023-07-07 DIAGNOSIS — E55.9 VITAMIN D DEFICIENCY: ICD-10-CM

## 2023-07-07 DIAGNOSIS — G43.109 MIGRAINE WITH AURA AND WITHOUT STATUS MIGRAINOSUS, NOT INTRACTABLE: ICD-10-CM

## 2023-07-07 DIAGNOSIS — R73.03 PREDIABETES: ICD-10-CM

## 2023-07-07 DIAGNOSIS — F33.9 RECURRENT DEPRESSION (HCC): ICD-10-CM

## 2023-07-07 DIAGNOSIS — L30.4 INTERTRIGO: ICD-10-CM

## 2023-07-07 DIAGNOSIS — Z12.4 CERVICAL CANCER SCREENING: ICD-10-CM

## 2023-07-07 PROCEDURE — 88175 CYTOPATH C/V AUTO FLUID REDO: CPT

## 2023-07-07 PROCEDURE — 99396 PREV VISIT EST AGE 40-64: CPT | Performed by: FAMILY MEDICINE

## 2023-07-07 PROCEDURE — 87624 HPV HI-RISK TYP POOLED RSLT: CPT

## 2023-07-07 RX ORDER — ERGOCALCIFEROL 1.25 MG/1
50000 CAPSULE ORAL WEEKLY
Qty: 12 CAPSULE | Refills: 0 | Status: SHIPPED | OUTPATIENT
Start: 2023-07-07

## 2023-07-07 RX ORDER — ATORVASTATIN CALCIUM 20 MG/1
20 TABLET, FILM COATED ORAL DAILY
Qty: 90 TABLET | Refills: 0 | Status: SHIPPED | OUTPATIENT
Start: 2023-07-07

## 2023-07-07 RX ORDER — CLOTRIMAZOLE AND BETAMETHASONE DIPROPIONATE 10; .64 MG/G; MG/G
CREAM TOPICAL
Qty: 45 G | Refills: 0 | Status: SHIPPED | OUTPATIENT
Start: 2023-07-07

## 2023-07-07 NOTE — PROGRESS NOTES
Jose Del Cid, 61 y.o.,  female    SUBJECTIVE  Well woman exam    Post menopausal, no pelvic symptoms    Depression-/migraine headaches reports to be doing well, continues to take cymbalta, finding this helpful with headaches, infrequent flares. Takes prn imitrex     Vitamin d def-     Prediabetes/ HL- reviewed CV risk factors, father with CAD his late 66's. She reports inconsistently taking pravachol, 's. IFG has improved. ROS:  See HPI, all others negative        Patient Active Problem List   Diagnosis    History of hypertension    Migraine    Colon cancer screening    Vitamin D deficiency    Prediabetes    Depression, major, recurrent, mild (HCC)    Episodic tension-type headache, not intractable    Recurrent depression (HCC)       Current Outpatient Medications   Medication Sig Dispense Refill    vitamin D (ERGOCALCIFEROL) 1.25 MG (46423 UT) CAPS capsule Take 1 capsule by mouth once a week 12 capsule 0    atorvastatin (LIPITOR) 20 MG tablet Take 1 tablet by mouth daily 90 tablet 0    clotrimazole-betamethasone (LOTRISONE) 1-0.05 % cream Apply topically 2 times daily. 45 g 0    DULoxetine (CYMBALTA) 30 MG extended release capsule Take 1 capsule by mouth daily      aspirin-acetaminophen-caffeine (EXCEDRIN MIGRAINE) 271-975-62 MG per tablet Take by mouth as needed       No current facility-administered medications for this visit.        No Known Allergies    Past Medical History:   Diagnosis Date    Anxiety     Depression     medication briefly a few years ago    Hypertension     previously on medication, stopped it on own about a year ago    Migraine     prn excedrine migraine    Prediabetes 5/2015    Vitamin D deficiency        Social History     Socioeconomic History    Marital status:      Spouse name: Not on file    Number of children: Not on file    Years of education: Not on file    Highest education level: Not on file   Occupational History    Not on file   Tobacco Use    Smoking

## 2023-09-28 ENCOUNTER — HOSPITAL ENCOUNTER (OUTPATIENT)
Facility: HOSPITAL | Age: 58
Discharge: HOME | End: 2023-09-28
Payer: COMMERCIAL

## 2023-09-28 LAB
25(OH)D3 SERPL-MCNC: 43 NG/ML (ref 30–100)
ALBUMIN SERPL-MCNC: 4.2 G/DL (ref 3.4–5)
ALBUMIN/GLOB SERPL: 1.2 (ref 0.8–1.7)
ALP SERPL-CCNC: 78 U/L (ref 45–117)
ALT SERPL-CCNC: 27 U/L (ref 13–56)
ANION GAP SERPL CALC-SCNC: 3 MMOL/L (ref 3–18)
AST SERPL-CCNC: 16 U/L (ref 10–38)
BASOPHILS # BLD: 0.1 K/UL (ref 0–0.1)
BASOPHILS NFR BLD: 1 % (ref 0–2)
BILIRUB SERPL-MCNC: 1.1 MG/DL (ref 0.2–1)
BUN SERPL-MCNC: 16 MG/DL (ref 7–18)
BUN/CREAT SERPL: 20 (ref 12–20)
CALCIUM SERPL-MCNC: 9.3 MG/DL (ref 8.5–10.1)
CHLORIDE SERPL-SCNC: 105 MMOL/L (ref 100–111)
CHOLEST SERPL-MCNC: 177 MG/DL
CO2 SERPL-SCNC: 31 MMOL/L (ref 21–32)
CREAT SERPL-MCNC: 0.79 MG/DL (ref 0.6–1.3)
DIFFERENTIAL METHOD BLD: ABNORMAL
EOSINOPHIL # BLD: 0.2 K/UL (ref 0–0.4)
EOSINOPHIL NFR BLD: 4 % (ref 0–5)
ERYTHROCYTE [DISTWIDTH] IN BLOOD BY AUTOMATED COUNT: 12.3 % (ref 11.6–14.5)
GLOBULIN SER CALC-MCNC: 3.4 G/DL (ref 2–4)
GLUCOSE SERPL-MCNC: 104 MG/DL (ref 74–99)
HCT VFR BLD AUTO: 45.5 % (ref 35–45)
HDLC SERPL-MCNC: 59 MG/DL (ref 40–60)
HDLC SERPL: 3 (ref 0–5)
HGB BLD-MCNC: 15.1 G/DL (ref 12–16)
IMM GRANULOCYTES # BLD AUTO: 0 K/UL (ref 0–0.04)
IMM GRANULOCYTES NFR BLD AUTO: 0 % (ref 0–0.5)
LDLC SERPL CALC-MCNC: 97 MG/DL (ref 0–100)
LIPID PANEL: NORMAL
LYMPHOCYTES # BLD: 1.4 K/UL (ref 0.9–3.6)
LYMPHOCYTES NFR BLD: 33 % (ref 21–52)
MCH RBC QN AUTO: 29.7 PG (ref 24–34)
MCHC RBC AUTO-ENTMCNC: 33.2 G/DL (ref 31–37)
MCV RBC AUTO: 89.6 FL (ref 78–100)
MONOCYTES # BLD: 0.4 K/UL (ref 0.05–1.2)
MONOCYTES NFR BLD: 8 % (ref 3–10)
NEUTS SEG # BLD: 2.3 K/UL (ref 1.8–8)
NEUTS SEG NFR BLD: 54 % (ref 40–73)
NRBC # BLD: 0 K/UL (ref 0–0.01)
NRBC BLD-RTO: 0 PER 100 WBC
PLATELET # BLD AUTO: 205 K/UL (ref 135–420)
PMV BLD AUTO: 11.3 FL (ref 9.2–11.8)
POTASSIUM SERPL-SCNC: 4.3 MMOL/L (ref 3.5–5.5)
PROT SERPL-MCNC: 7.6 G/DL (ref 6.4–8.2)
RBC # BLD AUTO: 5.08 M/UL (ref 4.2–5.3)
SODIUM SERPL-SCNC: 139 MMOL/L (ref 136–145)
TRIGL SERPL-MCNC: 105 MG/DL
VLDLC SERPL CALC-MCNC: 21 MG/DL
WBC # BLD AUTO: 4.3 K/UL (ref 4.6–13.2)

## 2023-09-28 PROCEDURE — 80053 COMPREHEN METABOLIC PANEL: CPT

## 2023-09-28 PROCEDURE — 85025 COMPLETE CBC W/AUTO DIFF WBC: CPT

## 2023-09-28 PROCEDURE — 80061 LIPID PANEL: CPT

## 2023-09-28 PROCEDURE — 36415 COLL VENOUS BLD VENIPUNCTURE: CPT

## 2023-09-28 PROCEDURE — 82306 VITAMIN D 25 HYDROXY: CPT

## 2023-10-02 RX ORDER — ERGOCALCIFEROL 1.25 MG/1
50000 CAPSULE ORAL WEEKLY
Qty: 12 CAPSULE | Refills: 0 | Status: SHIPPED | OUTPATIENT
Start: 2023-10-02

## 2024-03-12 ENCOUNTER — TRANSCRIBE ORDERS (OUTPATIENT)
Facility: HOSPITAL | Age: 59
End: 2024-03-12

## 2024-03-12 DIAGNOSIS — Z12.31 VISIT FOR SCREENING MAMMOGRAM: Primary | ICD-10-CM

## 2024-03-22 ENCOUNTER — HOSPITAL ENCOUNTER (OUTPATIENT)
Facility: HOSPITAL | Age: 59
Discharge: HOME | End: 2024-03-22
Attending: FAMILY MEDICINE
Payer: COMMERCIAL

## 2024-03-22 VITALS — WEIGHT: 190 LBS | BODY MASS INDEX: 32.44 KG/M2 | HEIGHT: 64 IN

## 2024-03-22 DIAGNOSIS — Z12.31 VISIT FOR SCREENING MAMMOGRAM: ICD-10-CM

## 2024-03-22 PROCEDURE — 77063 BREAST TOMOSYNTHESIS BI: CPT

## 2024-08-26 SDOH — ECONOMIC STABILITY: FOOD INSECURITY: WITHIN THE PAST 12 MONTHS, YOU WORRIED THAT YOUR FOOD WOULD RUN OUT BEFORE YOU GOT MONEY TO BUY MORE.: NEVER TRUE

## 2024-08-26 SDOH — ECONOMIC STABILITY: INCOME INSECURITY: HOW HARD IS IT FOR YOU TO PAY FOR THE VERY BASICS LIKE FOOD, HOUSING, MEDICAL CARE, AND HEATING?: NOT HARD AT ALL

## 2024-08-26 SDOH — ECONOMIC STABILITY: TRANSPORTATION INSECURITY
IN THE PAST 12 MONTHS, HAS LACK OF TRANSPORTATION KEPT YOU FROM MEETINGS, WORK, OR FROM GETTING THINGS NEEDED FOR DAILY LIVING?: NO

## 2024-08-26 SDOH — ECONOMIC STABILITY: FOOD INSECURITY: WITHIN THE PAST 12 MONTHS, THE FOOD YOU BOUGHT JUST DIDN'T LAST AND YOU DIDN'T HAVE MONEY TO GET MORE.: NEVER TRUE

## 2024-08-26 NOTE — PROGRESS NOTES
\"Have you been to the ER, urgent care clinic since your last visit?  Hospitalized since your last visit?\"    NO    “Have you seen or consulted any other health care providers outside of [facility] since your last visit?”    NO     “Have you had a pap smear?”    NO    Date of last Cervical Cancer screen (HPV or PAP): 7/7/2023             Click Here for Release of Records Request

## 2024-08-27 ENCOUNTER — HOSPITAL ENCOUNTER (OUTPATIENT)
Facility: HOSPITAL | Age: 59
Discharge: HOME | End: 2024-08-30
Payer: COMMERCIAL

## 2024-08-27 ENCOUNTER — OFFICE VISIT (OUTPATIENT)
Facility: CLINIC | Age: 59
End: 2024-08-27
Payer: COMMERCIAL

## 2024-08-27 VITALS
TEMPERATURE: 99.1 F | BODY MASS INDEX: 33.57 KG/M2 | WEIGHT: 182.4 LBS | RESPIRATION RATE: 16 BRPM | DIASTOLIC BLOOD PRESSURE: 84 MMHG | OXYGEN SATURATION: 99 % | HEART RATE: 75 BPM | SYSTOLIC BLOOD PRESSURE: 132 MMHG | HEIGHT: 62 IN

## 2024-08-27 DIAGNOSIS — Z13.220 LIPID SCREENING: ICD-10-CM

## 2024-08-27 DIAGNOSIS — G43.109 MIGRAINE WITH AURA AND WITHOUT STATUS MIGRAINOSUS, NOT INTRACTABLE: ICD-10-CM

## 2024-08-27 DIAGNOSIS — Z86.59 HISTORY OF DEPRESSION: ICD-10-CM

## 2024-08-27 DIAGNOSIS — R73.03 PREDIABETES: ICD-10-CM

## 2024-08-27 DIAGNOSIS — E55.9 VITAMIN D DEFICIENCY: ICD-10-CM

## 2024-08-27 DIAGNOSIS — E78.5 DYSLIPIDEMIA: Primary | ICD-10-CM

## 2024-08-27 PROBLEM — F33.0 DEPRESSION, MAJOR, RECURRENT, MILD (HCC): Status: RESOLVED | Noted: 2020-03-19 | Resolved: 2024-08-27

## 2024-08-27 LAB
25(OH)D3 SERPL-MCNC: 22.9 NG/ML (ref 30–100)
ALBUMIN SERPL-MCNC: 4.3 G/DL (ref 3.4–5)
ALBUMIN/GLOB SERPL: 1.5 (ref 0.8–1.7)
ALP SERPL-CCNC: 76 U/L (ref 45–117)
ALT SERPL-CCNC: 23 U/L (ref 13–56)
ANION GAP SERPL CALC-SCNC: 5 MMOL/L (ref 3–18)
AST SERPL-CCNC: 15 U/L (ref 10–38)
BASOPHILS # BLD: 0.1 K/UL (ref 0–0.1)
BASOPHILS NFR BLD: 1 % (ref 0–2)
BILIRUB SERPL-MCNC: 1 MG/DL (ref 0.2–1)
BUN SERPL-MCNC: 16 MG/DL (ref 7–18)
BUN/CREAT SERPL: 22 (ref 12–20)
CALCIUM SERPL-MCNC: 10.1 MG/DL (ref 8.5–10.1)
CHLORIDE SERPL-SCNC: 106 MMOL/L (ref 100–111)
CHOLEST SERPL-MCNC: 221 MG/DL
CO2 SERPL-SCNC: 29 MMOL/L (ref 21–32)
CREAT SERPL-MCNC: 0.74 MG/DL (ref 0.6–1.3)
DIFFERENTIAL METHOD BLD: ABNORMAL
EOSINOPHIL # BLD: 0.1 K/UL (ref 0–0.4)
EOSINOPHIL NFR BLD: 2 % (ref 0–5)
ERYTHROCYTE [DISTWIDTH] IN BLOOD BY AUTOMATED COUNT: 12.6 % (ref 11.6–14.5)
EST. AVERAGE GLUCOSE BLD GHB EST-MCNC: 111 MG/DL
GLOBULIN SER CALC-MCNC: 2.9 G/DL (ref 2–4)
GLUCOSE SERPL-MCNC: 80 MG/DL (ref 74–99)
HBA1C MFR BLD: 5.5 % (ref 4.2–5.6)
HCT VFR BLD AUTO: 45.9 % (ref 35–45)
HDLC SERPL-MCNC: 59 MG/DL (ref 40–60)
HDLC SERPL: 3.7 (ref 0–5)
HGB BLD-MCNC: 15 G/DL (ref 12–16)
IMM GRANULOCYTES # BLD AUTO: 0 K/UL (ref 0–0.04)
IMM GRANULOCYTES NFR BLD AUTO: 0 % (ref 0–0.5)
LDLC SERPL CALC-MCNC: 136.2 MG/DL (ref 0–100)
LIPID PANEL: ABNORMAL
LYMPHOCYTES # BLD: 1.9 K/UL (ref 0.9–3.6)
LYMPHOCYTES NFR BLD: 30 % (ref 21–52)
MCH RBC QN AUTO: 29.5 PG (ref 24–34)
MCHC RBC AUTO-ENTMCNC: 32.7 G/DL (ref 31–37)
MCV RBC AUTO: 90.4 FL (ref 78–100)
MONOCYTES # BLD: 0.5 K/UL (ref 0.05–1.2)
MONOCYTES NFR BLD: 8 % (ref 3–10)
NEUTS SEG # BLD: 3.7 K/UL (ref 1.8–8)
NEUTS SEG NFR BLD: 59 % (ref 40–73)
NRBC # BLD: 0 K/UL (ref 0–0.01)
NRBC BLD-RTO: 0 PER 100 WBC
PLATELET # BLD AUTO: 207 K/UL (ref 135–420)
PMV BLD AUTO: 11.5 FL (ref 9.2–11.8)
POTASSIUM SERPL-SCNC: 4.3 MMOL/L (ref 3.5–5.5)
PROT SERPL-MCNC: 7.2 G/DL (ref 6.4–8.2)
RBC # BLD AUTO: 5.08 M/UL (ref 4.2–5.3)
SODIUM SERPL-SCNC: 140 MMOL/L (ref 136–145)
TRIGL SERPL-MCNC: 129 MG/DL
VLDLC SERPL CALC-MCNC: 25.8 MG/DL
WBC # BLD AUTO: 6.2 K/UL (ref 4.6–13.2)

## 2024-08-27 PROCEDURE — 82306 VITAMIN D 25 HYDROXY: CPT

## 2024-08-27 PROCEDURE — 80053 COMPREHEN METABOLIC PANEL: CPT

## 2024-08-27 PROCEDURE — 80061 LIPID PANEL: CPT

## 2024-08-27 PROCEDURE — 83036 HEMOGLOBIN GLYCOSYLATED A1C: CPT

## 2024-08-27 PROCEDURE — 85025 COMPLETE CBC W/AUTO DIFF WBC: CPT

## 2024-08-27 PROCEDURE — 99214 OFFICE O/P EST MOD 30 MIN: CPT | Performed by: FAMILY MEDICINE

## 2024-08-27 PROCEDURE — 36415 COLL VENOUS BLD VENIPUNCTURE: CPT

## 2024-08-27 ASSESSMENT — PATIENT HEALTH QUESTIONNAIRE - PHQ9
7. TROUBLE CONCENTRATING ON THINGS, SUCH AS READING THE NEWSPAPER OR WATCHING TELEVISION: NOT AT ALL
1. LITTLE INTEREST OR PLEASURE IN DOING THINGS: NOT AT ALL
2. FEELING DOWN, DEPRESSED OR HOPELESS: NOT AT ALL
SUM OF ALL RESPONSES TO PHQ QUESTIONS 1-9: 1
SUM OF ALL RESPONSES TO PHQ9 QUESTIONS 1 & 2: 0
SUM OF ALL RESPONSES TO PHQ QUESTIONS 1-9: 1
8. MOVING OR SPEAKING SO SLOWLY THAT OTHER PEOPLE COULD HAVE NOTICED. OR THE OPPOSITE, BEING SO FIGETY OR RESTLESS THAT YOU HAVE BEEN MOVING AROUND A LOT MORE THAN USUAL: NOT AT ALL
9. THOUGHTS THAT YOU WOULD BE BETTER OFF DEAD, OR OF HURTING YOURSELF: NOT AT ALL
4. FEELING TIRED OR HAVING LITTLE ENERGY: SEVERAL DAYS
SUM OF ALL RESPONSES TO PHQ QUESTIONS 1-9: 1
5. POOR APPETITE OR OVEREATING: NOT AT ALL
3. TROUBLE FALLING OR STAYING ASLEEP: NOT AT ALL
SUM OF ALL RESPONSES TO PHQ QUESTIONS 1-9: 1
6. FEELING BAD ABOUT YOURSELF - OR THAT YOU ARE A FAILURE OR HAVE LET YOURSELF OR YOUR FAMILY DOWN: NOT AT ALL
10. IF YOU CHECKED OFF ANY PROBLEMS, HOW DIFFICULT HAVE THESE PROBLEMS MADE IT FOR YOU TO DO YOUR WORK, TAKE CARE OF THINGS AT HOME, OR GET ALONG WITH OTHER PEOPLE: NOT DIFFICULT AT ALL

## 2024-08-27 NOTE — PROGRESS NOTES
Zoë Landa, 58 y.o.,  female    SUBJECTIVE  Ff-up    Depression-/migraine headaches reports to be doing well, has not been seen since last year and says has weaned off cymbalta, has been headache free and mood stable.      Vitamin d def-hx of, not currently taking anything for this     Prediabetes/ HL- reviewed CV risk factors, father with CAD his late 70's. Has stopped taking lipitor, mostly just lost to ff-up.  Says had employee physical with elevated sugar and cholesterol levels she does not recall exact numbers.  ROS:  See HPI, all others negative        Patient Active Problem List   Diagnosis    History of hypertension    Migraine    Colon cancer screening    Vitamin D deficiency    Prediabetes    Episodic tension-type headache, not intractable       Current Outpatient Medications   Medication Sig Dispense Refill    atorvastatin (LIPITOR) 20 MG tablet Take 1 tablet by mouth daily (Patient not taking: Reported on 8/27/2024) 90 tablet 0     No current facility-administered medications for this visit.       No Known Allergies    Past Medical History:   Diagnosis Date    Anxiety     Depression     medication briefly a few years ago    Hypertension     previously on medication, stopped it on own about a year ago    Migraine     prn excedrine migraine    Prediabetes 5/2015    Vitamin D deficiency        Social History     Socioeconomic History    Marital status:      Spouse name: Not on file    Number of children: Not on file    Years of education: Not on file    Highest education level: Not on file   Occupational History    Not on file   Tobacco Use    Smoking status: Never    Smokeless tobacco: Never   Substance and Sexual Activity    Alcohol use: No    Drug use: Never    Sexual activity: Not on file     Comment: not for past 2 years   Other Topics Concern    Not on file   Social History Narrative    Not on file     Social Determinants of Health     Financial Resource Strain: Low Risk  (8/26/2024)     depression  monitoring    Episodic tension-type headache, not intractable  Stable    Ff-up in 2-3 weeks, labs prior  (Will need [employer] sometime this year on future follow ups)      Patient understands plan of care. Patient has provided input and agrees with goals.

## 2024-09-17 ENCOUNTER — OFFICE VISIT (OUTPATIENT)
Facility: CLINIC | Age: 59
End: 2024-09-17
Payer: COMMERCIAL

## 2024-09-17 VITALS
SYSTOLIC BLOOD PRESSURE: 130 MMHG | HEART RATE: 72 BPM | BODY MASS INDEX: 33.75 KG/M2 | WEIGHT: 183.4 LBS | DIASTOLIC BLOOD PRESSURE: 80 MMHG | HEIGHT: 62 IN | RESPIRATION RATE: 16 BRPM | TEMPERATURE: 97.7 F | OXYGEN SATURATION: 96 %

## 2024-09-17 DIAGNOSIS — G43.109 MIGRAINE WITH AURA AND WITHOUT STATUS MIGRAINOSUS, NOT INTRACTABLE: ICD-10-CM

## 2024-09-17 DIAGNOSIS — R73.03 PREDIABETES: ICD-10-CM

## 2024-09-17 DIAGNOSIS — E78.00 PURE HYPERCHOLESTEROLEMIA: Primary | ICD-10-CM

## 2024-09-17 DIAGNOSIS — E55.9 VITAMIN D DEFICIENCY: ICD-10-CM

## 2024-09-17 DIAGNOSIS — G44.219 EPISODIC TENSION-TYPE HEADACHE, NOT INTRACTABLE: ICD-10-CM

## 2024-09-17 PROCEDURE — 99214 OFFICE O/P EST MOD 30 MIN: CPT | Performed by: FAMILY MEDICINE

## 2024-09-17 RX ORDER — ERGOCALCIFEROL 1.25 MG/1
50000 CAPSULE, LIQUID FILLED ORAL WEEKLY
Qty: 12 CAPSULE | Refills: 0 | Status: SHIPPED | OUTPATIENT
Start: 2024-09-17

## 2024-09-17 RX ORDER — ATORVASTATIN CALCIUM 20 MG/1
20 TABLET, FILM COATED ORAL DAILY
Qty: 90 TABLET | Refills: 0 | Status: SHIPPED | OUTPATIENT
Start: 2024-09-17

## 2024-12-10 ENCOUNTER — HOSPITAL ENCOUNTER (OUTPATIENT)
Facility: HOSPITAL | Age: 59
Discharge: HOME | End: 2024-12-13
Payer: COMMERCIAL

## 2024-12-10 DIAGNOSIS — E78.00 PURE HYPERCHOLESTEROLEMIA: ICD-10-CM

## 2024-12-10 DIAGNOSIS — E55.9 VITAMIN D DEFICIENCY: ICD-10-CM

## 2024-12-10 LAB
25(OH)D3 SERPL-MCNC: 48.9 NG/ML (ref 30–100)
ALBUMIN SERPL-MCNC: 4.5 G/DL (ref 3.4–5)
ALBUMIN/GLOB SERPL: 1.3 (ref 0.8–1.7)
ALP SERPL-CCNC: 78 U/L (ref 45–117)
ALT SERPL-CCNC: 23 U/L (ref 13–56)
ANION GAP SERPL CALC-SCNC: 4 MMOL/L (ref 3–18)
AST SERPL-CCNC: 13 U/L (ref 10–38)
BILIRUB SERPL-MCNC: 0.8 MG/DL (ref 0.2–1)
BUN SERPL-MCNC: 22 MG/DL (ref 7–18)
BUN/CREAT SERPL: 32 (ref 12–20)
CALCIUM SERPL-MCNC: 10.4 MG/DL (ref 8.5–10.1)
CHLORIDE SERPL-SCNC: 104 MMOL/L (ref 100–111)
CHOLEST SERPL-MCNC: 236 MG/DL
CO2 SERPL-SCNC: 30 MMOL/L (ref 21–32)
CREAT SERPL-MCNC: 0.68 MG/DL (ref 0.6–1.3)
GLOBULIN SER CALC-MCNC: 3.5 G/DL (ref 2–4)
GLUCOSE SERPL-MCNC: 97 MG/DL (ref 74–99)
HDLC SERPL-MCNC: 62 MG/DL (ref 40–60)
HDLC SERPL: 3.8 (ref 0–5)
LDLC SERPL CALC-MCNC: 155.6 MG/DL (ref 0–100)
LIPID PANEL: ABNORMAL
POTASSIUM SERPL-SCNC: 4.4 MMOL/L (ref 3.5–5.5)
PROT SERPL-MCNC: 8 G/DL (ref 6.4–8.2)
SODIUM SERPL-SCNC: 138 MMOL/L (ref 136–145)
TRIGL SERPL-MCNC: 92 MG/DL
VLDLC SERPL CALC-MCNC: 18.4 MG/DL

## 2024-12-10 PROCEDURE — 80061 LIPID PANEL: CPT

## 2024-12-10 PROCEDURE — 82306 VITAMIN D 25 HYDROXY: CPT

## 2024-12-10 PROCEDURE — 80053 COMPREHEN METABOLIC PANEL: CPT

## 2024-12-10 PROCEDURE — 36415 COLL VENOUS BLD VENIPUNCTURE: CPT

## 2024-12-17 ENCOUNTER — OFFICE VISIT (OUTPATIENT)
Facility: CLINIC | Age: 59
End: 2024-12-17
Payer: COMMERCIAL

## 2024-12-17 VITALS
WEIGHT: 173 LBS | DIASTOLIC BLOOD PRESSURE: 70 MMHG | BODY MASS INDEX: 29.53 KG/M2 | OXYGEN SATURATION: 98 % | HEIGHT: 64 IN | HEART RATE: 67 BPM | RESPIRATION RATE: 16 BRPM | TEMPERATURE: 98.7 F | SYSTOLIC BLOOD PRESSURE: 124 MMHG

## 2024-12-17 DIAGNOSIS — E55.9 VITAMIN D DEFICIENCY: ICD-10-CM

## 2024-12-17 DIAGNOSIS — Z01.419 WELL WOMAN EXAM WITH ROUTINE GYNECOLOGICAL EXAM: Primary | ICD-10-CM

## 2024-12-17 DIAGNOSIS — Z12.4 CERVICAL CANCER SCREENING: ICD-10-CM

## 2024-12-17 DIAGNOSIS — E78.00 PURE HYPERCHOLESTEROLEMIA: ICD-10-CM

## 2024-12-17 PROCEDURE — 99396 PREV VISIT EST AGE 40-64: CPT | Performed by: FAMILY MEDICINE

## 2024-12-17 RX ORDER — ATORVASTATIN CALCIUM 20 MG/1
20 TABLET, FILM COATED ORAL DAILY
Qty: 90 TABLET | Refills: 0 | Status: SHIPPED | OUTPATIENT
Start: 2024-12-17

## 2024-12-17 RX ORDER — IBUPROFEN 800 MG/1
TABLET, FILM COATED ORAL
COMMUNITY
Start: 2024-10-25

## 2024-12-17 NOTE — PROGRESS NOTES
\"Have you been to the ER, urgent care clinic since your last visit?  Hospitalized since your last visit?\"    NO    “Have you seen or consulted any other health care providers outside our system since your last visit?”    NO     “Have you had a pap smear?”    NO    Date of last Cervical Cancer screen (HPV or PAP): 7/7/2023            
coronary artery disease  Discussed strategies to improve compliance  Resume Lipitor  -     Comprehensive Metabolic Panel; Future  -     Lipid Panel; Future    Cervical cancer screening  -     PAP IG, Aptima HPV and rfx 16/18,45 (199305); Future  -     PAP IG, Aptima HPV and rfx 16/18,45 ([id number])    Other orders  -     atorvastatin (LIPITOR) 20 MG tablet; Take 1 tablet by mouth daily      Ff-up in 3 months or sooner as needed    Patient understands plan of care. Patient has provided input and agrees with goals.

## 2024-12-24 LAB
CYTOLOGIST CVX/VAG CYTO: NORMAL
CYTOLOGY CVX/VAG DOC CYTO: NORMAL
CYTOLOGY CVX/VAG DOC THIN PREP: NORMAL
DX ICD CODE: NORMAL
HPV GENOTYPE REFLEX: NORMAL
HPV I/H RISK 4 DNA CVX QL PROBE+SIG AMP: NEGATIVE
Lab: NORMAL
OTHER STN SPEC: NORMAL
STAT OF ADQ CVX/VAG CYTO-IMP: NORMAL

## 2025-03-06 ENCOUNTER — HOSPITAL ENCOUNTER (OUTPATIENT)
Facility: HOSPITAL | Age: 60
Discharge: HOME | End: 2025-03-09
Payer: COMMERCIAL

## 2025-03-06 DIAGNOSIS — E78.00 PURE HYPERCHOLESTEROLEMIA: ICD-10-CM

## 2025-03-06 LAB
ALBUMIN SERPL-MCNC: 4.2 G/DL (ref 3.4–5)
ALBUMIN/GLOB SERPL: 1.2 (ref 0.8–1.7)
ALP SERPL-CCNC: 83 U/L (ref 45–117)
ALT SERPL-CCNC: 26 U/L (ref 13–56)
ANION GAP SERPL CALC-SCNC: 5 MMOL/L (ref 3–18)
AST SERPL-CCNC: 15 U/L (ref 10–38)
BILIRUB SERPL-MCNC: 1.1 MG/DL (ref 0.2–1)
BUN SERPL-MCNC: 13 MG/DL (ref 7–18)
BUN/CREAT SERPL: 18 (ref 12–20)
CALCIUM SERPL-MCNC: 9.6 MG/DL (ref 8.5–10.1)
CHLORIDE SERPL-SCNC: 105 MMOL/L (ref 100–111)
CHOLEST SERPL-MCNC: 163 MG/DL
CO2 SERPL-SCNC: 29 MMOL/L (ref 21–32)
CREAT SERPL-MCNC: 0.73 MG/DL (ref 0.6–1.3)
GLOBULIN SER CALC-MCNC: 3.4 G/DL (ref 2–4)
GLUCOSE SERPL-MCNC: 94 MG/DL (ref 74–99)
HDLC SERPL-MCNC: 61 MG/DL (ref 40–60)
HDLC SERPL: 2.7 (ref 0–5)
LDLC SERPL CALC-MCNC: 85 MG/DL (ref 0–100)
LIPID PANEL: ABNORMAL
POTASSIUM SERPL-SCNC: 4.4 MMOL/L (ref 3.5–5.5)
PROT SERPL-MCNC: 7.6 G/DL (ref 6.4–8.2)
SODIUM SERPL-SCNC: 139 MMOL/L (ref 136–145)
TRIGL SERPL-MCNC: 85 MG/DL
VLDLC SERPL CALC-MCNC: 17 MG/DL

## 2025-03-06 PROCEDURE — 80061 LIPID PANEL: CPT

## 2025-03-06 PROCEDURE — 36415 COLL VENOUS BLD VENIPUNCTURE: CPT

## 2025-03-06 PROCEDURE — 80053 COMPREHEN METABOLIC PANEL: CPT

## 2025-05-05 ENCOUNTER — TRANSCRIBE ORDERS (OUTPATIENT)
Facility: HOSPITAL | Age: 60
End: 2025-05-05

## 2025-05-05 DIAGNOSIS — Z12.31 ENCOUNTER FOR SCREENING MAMMOGRAM FOR MALIGNANT NEOPLASM OF BREAST: Primary | ICD-10-CM

## 2025-05-30 ENCOUNTER — HOSPITAL ENCOUNTER (OUTPATIENT)
Facility: HOSPITAL | Age: 60
Discharge: HOME | End: 2025-05-30
Attending: ORTHOPAEDIC SURGERY
Payer: COMMERCIAL

## 2025-05-30 DIAGNOSIS — Z12.31 ENCOUNTER FOR SCREENING MAMMOGRAM FOR MALIGNANT NEOPLASM OF BREAST: ICD-10-CM

## 2025-05-30 PROCEDURE — 77063 BREAST TOMOSYNTHESIS BI: CPT

## 2025-05-30 PROCEDURE — 77067 SCR MAMMO BI INCL CAD: CPT

## (undated) DEVICE — YANKAUER,SMOOTH HANDLE,HIGH CAPACITY: Brand: MEDLINE INDUSTRIES, INC.

## (undated) DEVICE — SYR 10ML LUER LOK 1/5ML GRAD --

## (undated) DEVICE — GAUZE,SPONGE,4"X4",16PLY,STRL,LF,10/TRAY: Brand: MEDLINE

## (undated) DEVICE — SYRINGE MED 25GA 3ML L5/8IN SUBQ PLAS W/ DETACH NDL SFTY

## (undated) DEVICE — GOWN ISOL IMPERV UNIV, DISP, OPEN BACK, BLUE --

## (undated) DEVICE — LINER SUCT CANSTR 3000CC PLAS SFT PRE ASSEMB W/OUT TBNG W/

## (undated) DEVICE — SOLUTION IRRIG 1000ML H2O STRL BLT

## (undated) DEVICE — MEDI-VAC NON-CONDUCTIVE SUCTION TUBING: Brand: CARDINAL HEALTH

## (undated) DEVICE — CATHETER SUCT TR FL TIP 14FR W/ O CTRL

## (undated) DEVICE — TRAP SPEC COLL POLYP POLYSTYR --

## (undated) DEVICE — ENDOSCOPY PUMP TUBING/ CAP SET: Brand: ERBE

## (undated) DEVICE — CANNULA ORIG TL CLR W FOAM CUSHIONS AND 14FT SUPL TB 3 CHN

## (undated) DEVICE — CANNULA NSL AD TBNG L14FT STD PVC O2 CRV CONN NONFLARED NSL

## (undated) DEVICE — SYR 50ML SLIP TIP NSAF LF STRL --

## (undated) DEVICE — SNARE POLYP M W27MMXL240CM OVL STIFF DISP CAPTIVATOR

## (undated) DEVICE — SYR 20ML LL STRL LF --

## (undated) DEVICE — FLUFF AND POLYMER UNDERPAD,EXTRA HEAVY: Brand: WINGS